# Patient Record
Sex: FEMALE | Race: BLACK OR AFRICAN AMERICAN | NOT HISPANIC OR LATINO | URBAN - METROPOLITAN AREA
[De-identification: names, ages, dates, MRNs, and addresses within clinical notes are randomized per-mention and may not be internally consistent; named-entity substitution may affect disease eponyms.]

---

## 2023-04-01 ENCOUNTER — INPATIENT (INPATIENT)
Facility: HOSPITAL | Age: 73
LOS: 8 days | Discharge: HOME | End: 2023-04-10
Attending: STUDENT IN AN ORGANIZED HEALTH CARE EDUCATION/TRAINING PROGRAM | Admitting: STUDENT IN AN ORGANIZED HEALTH CARE EDUCATION/TRAINING PROGRAM
Payer: MEDICARE

## 2023-04-01 VITALS
OXYGEN SATURATION: 97 % | DIASTOLIC BLOOD PRESSURE: 47 MMHG | TEMPERATURE: 100 F | HEART RATE: 92 BPM | RESPIRATION RATE: 16 BRPM | SYSTOLIC BLOOD PRESSURE: 103 MMHG

## 2023-04-01 DIAGNOSIS — I26.99 OTHER PULMONARY EMBOLISM WITHOUT ACUTE COR PULMONALE: ICD-10-CM

## 2023-04-01 DIAGNOSIS — R09.89 OTHER SPECIFIED SYMPTOMS AND SIGNS INVOLVING THE CIRCULATORY AND RESPIRATORY SYSTEMS: ICD-10-CM

## 2023-04-01 DIAGNOSIS — K57.32 DIVERTICULITIS OF LARGE INTESTINE WITHOUT PERFORATION OR ABSCESS WITHOUT BLEEDING: ICD-10-CM

## 2023-04-01 DIAGNOSIS — Z98.891 HISTORY OF UTERINE SCAR FROM PREVIOUS SURGERY: Chronic | ICD-10-CM

## 2023-04-01 LAB
ALBUMIN SERPL ELPH-MCNC: 2.6 G/DL — LOW (ref 3.3–5)
ALP SERPL-CCNC: 138 U/L — HIGH (ref 40–120)
ALT FLD-CCNC: 9 U/L — SIGNIFICANT CHANGE UP (ref 4–33)
ANION GAP SERPL CALC-SCNC: 10 MMOL/L — SIGNIFICANT CHANGE UP (ref 7–14)
ANISOCYTOSIS BLD QL: SLIGHT — SIGNIFICANT CHANGE UP
APTT BLD: 29.1 SEC — SIGNIFICANT CHANGE UP (ref 27–36.3)
AST SERPL-CCNC: 14 U/L — SIGNIFICANT CHANGE UP (ref 4–32)
BASE EXCESS BLDV CALC-SCNC: -0.9 MMOL/L — SIGNIFICANT CHANGE UP (ref -2–3)
BASOPHILS # BLD AUTO: 0 K/UL — SIGNIFICANT CHANGE UP (ref 0–0.2)
BASOPHILS NFR BLD AUTO: 0 % — SIGNIFICANT CHANGE UP (ref 0–2)
BILIRUB SERPL-MCNC: 0.4 MG/DL — SIGNIFICANT CHANGE UP (ref 0.2–1.2)
BLOOD GAS VENOUS COMPREHENSIVE RESULT: SIGNIFICANT CHANGE UP
BUN SERPL-MCNC: 13 MG/DL — SIGNIFICANT CHANGE UP (ref 7–23)
CALCIUM SERPL-MCNC: 8.5 MG/DL — SIGNIFICANT CHANGE UP (ref 8.4–10.5)
CHLORIDE BLDV-SCNC: 110 MMOL/L — HIGH (ref 96–108)
CHLORIDE SERPL-SCNC: 105 MMOL/L — SIGNIFICANT CHANGE UP (ref 98–107)
CO2 BLDV-SCNC: 24.5 MMOL/L — SIGNIFICANT CHANGE UP (ref 22–26)
CO2 SERPL-SCNC: 22 MMOL/L — SIGNIFICANT CHANGE UP (ref 22–31)
CREAT SERPL-MCNC: 0.88 MG/DL — SIGNIFICANT CHANGE UP (ref 0.5–1.3)
EGFR: 71 ML/MIN/1.73M2 — SIGNIFICANT CHANGE UP
EOSINOPHIL # BLD AUTO: 0 K/UL — SIGNIFICANT CHANGE UP (ref 0–0.5)
EOSINOPHIL NFR BLD AUTO: 0 % — SIGNIFICANT CHANGE UP (ref 0–6)
FLUAV AG NPH QL: SIGNIFICANT CHANGE UP
FLUBV AG NPH QL: SIGNIFICANT CHANGE UP
GAS PNL BLDV: 136 MMOL/L — SIGNIFICANT CHANGE UP (ref 136–145)
GIANT PLATELETS BLD QL SMEAR: PRESENT — SIGNIFICANT CHANGE UP
GLUCOSE BLDV-MCNC: 92 MG/DL — SIGNIFICANT CHANGE UP (ref 70–99)
GLUCOSE SERPL-MCNC: 96 MG/DL — SIGNIFICANT CHANGE UP (ref 70–99)
HCO3 BLDV-SCNC: 23 MMOL/L — SIGNIFICANT CHANGE UP (ref 22–29)
HCT VFR BLD CALC: 24.3 % — LOW (ref 34.5–45)
HCT VFR BLDA CALC: 22 % — LOW (ref 34.5–46.5)
HGB BLD CALC-MCNC: 7.4 G/DL — LOW (ref 11.7–16.1)
HGB BLD-MCNC: 7.3 G/DL — LOW (ref 11.5–15.5)
HYPOCHROMIA BLD QL: SLIGHT — SIGNIFICANT CHANGE UP
IANC: 8.82 K/UL — HIGH (ref 1.8–7.4)
INR BLD: 1.52 RATIO — HIGH (ref 0.88–1.16)
LACTATE BLDV-MCNC: 0.9 MMOL/L — SIGNIFICANT CHANGE UP (ref 0.5–2)
LIDOCAIN IGE QN: 10 U/L — SIGNIFICANT CHANGE UP (ref 7–60)
LYMPHOCYTES # BLD AUTO: 1.69 K/UL — SIGNIFICANT CHANGE UP (ref 1–3.3)
LYMPHOCYTES # BLD AUTO: 14.8 % — SIGNIFICANT CHANGE UP (ref 13–44)
MAGNESIUM SERPL-MCNC: 2.1 MG/DL — SIGNIFICANT CHANGE UP (ref 1.6–2.6)
MCHC RBC-ENTMCNC: 22.5 PG — LOW (ref 27–34)
MCHC RBC-ENTMCNC: 30 GM/DL — LOW (ref 32–36)
MCV RBC AUTO: 74.8 FL — LOW (ref 80–100)
MICROCYTES BLD QL: SLIGHT — SIGNIFICANT CHANGE UP
MONOCYTES # BLD AUTO: 1.09 K/UL — HIGH (ref 0–0.9)
MONOCYTES NFR BLD AUTO: 9.6 % — SIGNIFICANT CHANGE UP (ref 2–14)
NEUTROPHILS # BLD AUTO: 8.61 K/UL — HIGH (ref 1.8–7.4)
NEUTROPHILS NFR BLD AUTO: 75.6 % — SIGNIFICANT CHANGE UP (ref 43–77)
PCO2 BLDV: 36 MMHG — LOW (ref 39–52)
PH BLDV: 7.42 — SIGNIFICANT CHANGE UP (ref 7.32–7.43)
PLAT MORPH BLD: NORMAL — SIGNIFICANT CHANGE UP
PLATELET # BLD AUTO: 424 K/UL — HIGH (ref 150–400)
PLATELET COUNT - ESTIMATE: NORMAL — SIGNIFICANT CHANGE UP
PO2 BLDV: 130 MMHG — HIGH (ref 25–45)
POLYCHROMASIA BLD QL SMEAR: SLIGHT — SIGNIFICANT CHANGE UP
POTASSIUM BLDV-SCNC: 4 MMOL/L — SIGNIFICANT CHANGE UP (ref 3.5–5.1)
POTASSIUM SERPL-MCNC: 4.5 MMOL/L — SIGNIFICANT CHANGE UP (ref 3.5–5.3)
POTASSIUM SERPL-SCNC: 4.5 MMOL/L — SIGNIFICANT CHANGE UP (ref 3.5–5.3)
PROT SERPL-MCNC: 6.7 G/DL — SIGNIFICANT CHANGE UP (ref 6–8.3)
PROTHROM AB SERPL-ACNC: 17.7 SEC — HIGH (ref 10.5–13.4)
RBC # BLD: 3.25 M/UL — LOW (ref 3.8–5.2)
RBC # FLD: 17.3 % — HIGH (ref 10.3–14.5)
RBC BLD AUTO: ABNORMAL
RSV RNA NPH QL NAA+NON-PROBE: SIGNIFICANT CHANGE UP
SAO2 % BLDV: 97.5 % — HIGH (ref 67–88)
SARS-COV-2 RNA SPEC QL NAA+PROBE: SIGNIFICANT CHANGE UP
SODIUM SERPL-SCNC: 137 MMOL/L — SIGNIFICANT CHANGE UP (ref 135–145)
WBC # BLD: 11.39 K/UL — HIGH (ref 3.8–10.5)
WBC # FLD AUTO: 11.39 K/UL — HIGH (ref 3.8–10.5)

## 2023-04-01 PROCEDURE — 74177 CT ABD & PELVIS W/CONTRAST: CPT | Mod: 26,MA

## 2023-04-01 PROCEDURE — 99223 1ST HOSP IP/OBS HIGH 75: CPT | Mod: GC

## 2023-04-01 PROCEDURE — 99291 CRITICAL CARE FIRST HOUR: CPT

## 2023-04-01 PROCEDURE — 99221 1ST HOSP IP/OBS SF/LOW 40: CPT

## 2023-04-01 PROCEDURE — 71275 CT ANGIOGRAPHY CHEST: CPT | Mod: 26

## 2023-04-01 RX ORDER — PIPERACILLIN AND TAZOBACTAM 4; .5 G/20ML; G/20ML
3.38 INJECTION, POWDER, LYOPHILIZED, FOR SOLUTION INTRAVENOUS ONCE
Refills: 0 | Status: DISCONTINUED | OUTPATIENT
Start: 2023-04-01 | End: 2023-04-01

## 2023-04-01 RX ORDER — CHLORHEXIDINE GLUCONATE 213 G/1000ML
1 SOLUTION TOPICAL DAILY
Refills: 0 | Status: DISCONTINUED | OUTPATIENT
Start: 2023-04-01 | End: 2023-04-02

## 2023-04-01 RX ORDER — PIPERACILLIN AND TAZOBACTAM 4; .5 G/20ML; G/20ML
3.38 INJECTION, POWDER, LYOPHILIZED, FOR SOLUTION INTRAVENOUS EVERY 8 HOURS
Refills: 0 | Status: COMPLETED | OUTPATIENT
Start: 2023-04-01 | End: 2023-04-08

## 2023-04-01 RX ORDER — HYDROMORPHONE HYDROCHLORIDE 2 MG/ML
0.5 INJECTION INTRAMUSCULAR; INTRAVENOUS; SUBCUTANEOUS EVERY 6 HOURS
Refills: 0 | Status: DISCONTINUED | OUTPATIENT
Start: 2023-04-01 | End: 2023-04-02

## 2023-04-01 RX ORDER — OXYCODONE HYDROCHLORIDE 5 MG/1
5 TABLET ORAL EVERY 6 HOURS
Refills: 0 | Status: DISCONTINUED | OUTPATIENT
Start: 2023-04-01 | End: 2023-04-01

## 2023-04-01 RX ORDER — SODIUM CHLORIDE 9 MG/ML
1000 INJECTION, SOLUTION INTRAVENOUS
Refills: 0 | Status: DISCONTINUED | OUTPATIENT
Start: 2023-04-01 | End: 2023-04-04

## 2023-04-01 RX ORDER — HEPARIN SODIUM 5000 [USP'U]/ML
1300 INJECTION INTRAVENOUS; SUBCUTANEOUS
Qty: 25000 | Refills: 0 | Status: DISCONTINUED | OUTPATIENT
Start: 2023-04-01 | End: 2023-04-02

## 2023-04-01 RX ORDER — HEPARIN SODIUM 5000 [USP'U]/ML
1300 INJECTION INTRAVENOUS; SUBCUTANEOUS
Qty: 25000 | Refills: 0 | Status: DISCONTINUED | OUTPATIENT
Start: 2023-04-01 | End: 2023-04-01

## 2023-04-01 RX ORDER — ONDANSETRON 8 MG/1
4 TABLET, FILM COATED ORAL ONCE
Refills: 0 | Status: COMPLETED | OUTPATIENT
Start: 2023-04-01 | End: 2023-04-01

## 2023-04-01 RX ORDER — PIPERACILLIN AND TAZOBACTAM 4; .5 G/20ML; G/20ML
3.38 INJECTION, POWDER, LYOPHILIZED, FOR SOLUTION INTRAVENOUS ONCE
Refills: 0 | Status: COMPLETED | OUTPATIENT
Start: 2023-04-01 | End: 2023-04-01

## 2023-04-01 RX ORDER — ACETAMINOPHEN 500 MG
650 TABLET ORAL ONCE
Refills: 0 | Status: COMPLETED | OUTPATIENT
Start: 2023-04-01 | End: 2023-04-01

## 2023-04-01 RX ORDER — SODIUM CHLORIDE 9 MG/ML
1000 INJECTION, SOLUTION INTRAVENOUS ONCE
Refills: 0 | Status: COMPLETED | OUTPATIENT
Start: 2023-04-01 | End: 2023-04-01

## 2023-04-01 RX ORDER — ACETAMINOPHEN 500 MG
650 TABLET ORAL EVERY 6 HOURS
Refills: 0 | Status: DISCONTINUED | OUTPATIENT
Start: 2023-04-01 | End: 2023-04-02

## 2023-04-01 RX ADMIN — HEPARIN SODIUM 13 UNIT(S)/HR: 5000 INJECTION INTRAVENOUS; SUBCUTANEOUS at 18:14

## 2023-04-01 RX ADMIN — SODIUM CHLORIDE 125 MILLILITER(S): 9 INJECTION, SOLUTION INTRAVENOUS at 20:09

## 2023-04-01 RX ADMIN — PIPERACILLIN AND TAZOBACTAM 25 GRAM(S): 4; .5 INJECTION, POWDER, LYOPHILIZED, FOR SOLUTION INTRAVENOUS at 22:02

## 2023-04-01 RX ADMIN — HEPARIN SODIUM 13 UNIT(S)/HR: 5000 INJECTION INTRAVENOUS; SUBCUTANEOUS at 20:09

## 2023-04-01 RX ADMIN — PIPERACILLIN AND TAZOBACTAM 3.38 GRAM(S): 4; .5 INJECTION, POWDER, LYOPHILIZED, FOR SOLUTION INTRAVENOUS at 14:18

## 2023-04-01 RX ADMIN — SODIUM CHLORIDE 1000 MILLILITER(S): 9 INJECTION, SOLUTION INTRAVENOUS at 10:46

## 2023-04-01 RX ADMIN — PIPERACILLIN AND TAZOBACTAM 200 GRAM(S): 4; .5 INJECTION, POWDER, LYOPHILIZED, FOR SOLUTION INTRAVENOUS at 13:48

## 2023-04-01 RX ADMIN — Medication 650 MILLIGRAM(S): at 10:45

## 2023-04-01 RX ADMIN — SODIUM CHLORIDE 100 MILLILITER(S): 9 INJECTION, SOLUTION INTRAVENOUS at 18:18

## 2023-04-01 RX ADMIN — Medication 650 MILLIGRAM(S): at 11:15

## 2023-04-01 RX ADMIN — SODIUM CHLORIDE 1000 MILLILITER(S): 9 INJECTION, SOLUTION INTRAVENOUS at 11:46

## 2023-04-01 NOTE — ED PROVIDER NOTE - CARE PLAN
1 Principal Discharge DX:	Nausea vomiting and diarrhea   Principal Discharge DX:	Diverticulitis of large intestine with complication  Secondary Diagnosis:	Pulmonary embolism

## 2023-04-01 NOTE — ED PROVIDER NOTE - OBJECTIVE STATEMENT
72-year-old female with no reported past medical history presents to the ED with 2-week history of left lower quadrant abdominal pain.  Pain does not radiate anywhere else.  Patient endorses associated nausea, vomiting, and diarrhea.  Patient has been unable to tolerate p.o. and endorsing nonbilious nonbloody vomitus.  Patient states that her diarrhea is nonbloody, watery in nature.  She denies any fevers, chills, headaches, focal neurologic deficits, numbness, or tingling.  Pain is rated as 8 out of 10, waxing and waning in nature.  No exacerbating or alleviating factors.  No recent travel history, no vacations, no recent seafood consumption.  She denies any other symptoms at bedside.

## 2023-04-01 NOTE — H&P ADULT - ATTENDING COMMENTS
Perforated diverticulitis vs mass w/ acute PE  -Heparin  -transfuse PRBC as needed  -NPO  -IV abx  -SICU care appreciated  -serial abdominal exams

## 2023-04-01 NOTE — H&P ADULT - HISTORY OF PRESENT ILLNESS
72-year-old female with no reported past medical history, psh *2 presents to the ED with 2-week history of left lower quadrant abdominal pain.  Pain does not radiate anywhere else.  Patient endorses associated nausea, vomiting, diarrhea and poor p.o  tolerance. Patient states saw blood with BM twice in the past 2 weeks, obvious weight loss within last 2 weeks also.  She denies any fevers, chills, headaches, focal neurologic deficits, numbness, or tingling.  Last colonoscopy within last 5-10 years, polyps were found per patient. Patient also endorse she has chronic anemia but does not know baseline Hg.     In ER,  patient is afebrile, HDS. LLQ tenderness, no peritoneal sign, WBC 11.4, Hg 7.3, CTAP showed  descending and proximal sigmoid colon inflammation with air containing extraluminal collection, possible  diverticulitis, inflammatory bowel disease or neoplasm. Besides, acute RLL PE captured on CTAP.

## 2023-04-01 NOTE — ED ADULT NURSE NOTE - CAS EDP DISCH DISPOSITION ADMI
From: Alverto Culver  To: Andrea Smiley PA-C  Sent: 1/17/2018 10:49 AM EST  Subject: Medication Renewal Request    Original authorizing provider: DAFNE Mitchell would like a refill of the following medications:  hydrocortisone valerate (WESTCORT) 0.2 % topical cream [Nena Lewis PA-C]  gabapentin (NEURONTIN) 100 mg capsule Lauern Lewis PA-C]  tamsulosin (FLOMAX) 0.4 mg capsule Lauren Lewis PA-C]  ketoconazole (NIZORAL) 2 % shampoo [Nena Lewis PA-C]  albuterol (PROVENTIL HFA, VENTOLIN HFA, PROAIR HFA) 90 mcg/actuation inhaler [Nena Lewis PA-C]  NIFEdipine ER (ADALAT CC) 60 mg ER tablet [Nena Lewis PA-C]  metFORMIN ER (GLUCOPHAGE XR) 750 mg tablet Andrea Smiley PA-C]    Preferred pharmacy: [facility] [address]    Comment:      Medication renewals requested in this message routed to other providers:  fluticasone-salmeterol (ADVAIR) 250-50 mcg/dose diskus inhaler [Radha Moulton PA-C] SICU/ICU

## 2023-04-01 NOTE — CONSULT NOTE ADULT - ASSESSMENT
ASSESSMENT:  72 F no PMH, PSH of  p/w LLQ abd pain and diarrhea for 2 weeks, labs significant for anemia with Hg 7.3, CTAP showed contained perforation possible from acute diverticulitis or neoplasm; with incidental finding of right lower lobe acute PE.  Patient is HDS. SICU consulted for concerns for potential hemorrhage after initiation of AC therapy for PE finding.      PLAN:    Neuro:  - Multimodal pain control    Resp:  - On room air  - Maintain SpO2 > 92%  - Obtain CTA PE protocol to better evaluate PE when able    CV:  - Hemodynamically stable  - Goal MAP > 65 mmHg    GI:   - NPO  - Serial abdominal exams    Renal:  - IVF resuscitation - LR  - Monitor I&Os and electrolytes w/ repletions as necessary    Heme:  - Initiate hep gtt for therapeutic AC for PE (goal aPTT 58-99)  - Monitor CBC and coags q6h    ID:   - Monitor for clinical evidence of active infection  - Monitor WBC and temperature  - Antibiotics: Zosyn    Endo:   - Monitor glucose on metabolic panel    Code Status: Full code    Disposition: SICU

## 2023-04-01 NOTE — ED ADULT NURSE NOTE - OBJECTIVE STATEMENT
Received patient in room 23 c/o Received patient in room 23 c/o nausea, vomiting, diarrhea, blood in stool, abdominal pain x 2 weeks, patient denies fever, chills, SOB, chest pain. Patient is on cardiac monitor NSR w/O2 sat 100% on a room air. Patient is A&OX4, airway patent, breathing unlabored and even, radial pulses palpable, abdomen, soft, tender. Labs obtained, 22G IV placed on left hand, medications given as ordered, IV fluid bolus infusing, awaiting CT scan. Side rails up and safety maintained. Fall precaution in place. Call bells within reach. Received patient in room 23 c/o nausea, vomiting, diarrhea, blood in stool, abdominal pain x 2 weeks, patient denies fever, chills, SOB, chest pain. Patient is on cardiac monitor NSR w/O2 sat 100% on a room air. Patient is A&OX4, airway patent, breathing unlabored and even, radial pulses palpable, abdomen, soft, tender. Labs obtained, 20G IV placed on left arm, medications given as ordered, IV fluid bolus infusing, awaiting CT scan. Side rails up and safety maintained. Fall precaution in place. Call bells within reach.

## 2023-04-01 NOTE — H&P ADULT - ASSESSMENT
72 F no PMH, PSH of  p/w LLQ abd pain and diarrhea for 2 weeks, labs significant for anemia with Hg 7.3, CTAP showed contained perforation possible from acute diverticulitis or neoplasm,    -year-old female with no reported past medical history, psh *2 presents to the ED with 2-week history of left lower quadrant abdominal pain.  Pain does not radiate anywhere else.  Patient endorses associated nausea, vomiting, diarrhea and poor p.o  tolerance. Patient states saw blood with BM twice in the past 2 weeks, obvious weight loss within last 2 weeks also.  She denies any fevers, chills, headaches, focal neurologic deficits, numbness, or tingling.  Last colonoscopy within last 5-10 years, polyps were found per patient. Patient also endorse she has chronic anemia but does not know baseline Hg.     In ER,  patient is afebrile, HDS. LLQ tenderness, no peritoneal sign, WBC 11.4, Hg 7.3, CTAP showed  descending and proximal sigmoid colon inflammation with air containing extraluminal collection, possible  diverticulitis, inflammatory bowel disease or neoplasm. Besides, acute RLL PE captured on CTAP.  72 F no PMH, PSH of  p/w LLQ abd pain and diarrhea for 2 weeks, labs significant for anemia with Hg 7.3, CTAP showed contained perforation possible from acute diverticulitis or neoplasm; with incidental finding of right lower lobe acute PE.  Patient is HDS.     Plan:  - admit to surgery, Dr. Merrill  [facility] with [facility] w/ unknown source planning for therapeutic AC; pending response  - start therapeutic heparin gtt  - will get b/l LE venous duplex to r/o DVT  - will get CTPE to better eval PE status  - will get CTA  - NPO/IVF  - iv abx, zosyn  - f/u labs  - plan discussed with fellow on call on behalf of Dr. Garfield Lewis, Joshua PGY-2  A team Surgery  [id number]    72 F no PMH, PSH of  p/w LLQ abd pain and diarrhea for 2 weeks, labs significant for anemia with Hg 7.3, CTAP showed contained perforation possible from acute diverticulitis or neoplasm; with incidental finding of right lower lobe acute PE.  Patient is HDS.     Plan:  - admit to surgery, Dr. Merrill  [facility] with [facility] w/ unknown source planning for therapeutic AC; pending response  - start therapeutic heparin gtt  - 2 units PRBC transfusion stat  - will get b/l LE venous duplex to r/o DVT  - will get CTPE to better eval PE status  - will get CTA  - NPO/IVF  - iv abx, zosyn  - f/u labs  - plan discussed with fellow on call on behalf of Dr. Garfield Lewis, Joshua PGY-2  A team Surgery  [id number]

## 2023-04-01 NOTE — ED PROVIDER NOTE - ATTENDING CONTRIBUTION TO CARE
Attending MD Ames:  I performed a history and physical exam of the patient and discussed their management with the resident. I reviewed the resident's note and agree with the documented findings and plan of care. My medical decision making and observations are found above. Attending MD Ames:  I performed a history and physical exam of the patient and discussed their management with the resident. I reviewed the resident's note and agree with the documented findings and plan of care. My medical decision making and observations are found above.    Critical care billing:  Upon my evaluation, this patient had a high probability of imminent or life-threatening deterioration due to poss diverticular perf -> SICU, which required my direct attention, intervention, and personal management.  The patient has a  medical condition that impairs one or more vital organ systems.  Frequent personal assessment and adjustment of medical interventions was performed.      I have personally provided 30 minutes of critical care time exclusive of time spent on separately billable procedures. Time includes review of laboratory data, radiology results, discussion with consultants, patient and family; monitoring for potential decompensation, as well as time spent retrieving data and reviewing the chart and documenting the visit. Interventions were performed as documented above.

## 2023-04-01 NOTE — ED PROVIDER NOTE - CLINICAL SUMMARY MEDICAL DECISION MAKING FREE TEXT BOX
72-year-old female with no reported past medical history presents to the ED with 2-week history of left lower quadrant abdominal pain. Endorses associated nonbilious nonbloody nausea and nonbloody diarrhea.  Initial vitals nonactionable.  Afebrile, normotensive, breathing comfortably at bedside without any acute distress.  Physical exam as noted above.  Patient is otherwise well-appearing, left lower quadrant abdominal pain with guarding and tenderness to palpation.  Other parts of the abdominal exam is otherwise benign.  No rashes noted.  Dry mucous membranes.  Differential diagnosis includes but not limited to infectious colitis versus autoimmune colitis versus diverticulitis versus metabolic derangements.  Will order labs, EKG, meds, imaging, and reassess. 72-year-old female with no reported past medical history presents to the ED with 2-week history of left lower quadrant abdominal pain. Endorses associated nonbilious nonbloody nausea and nonbloody diarrhea.  Initial vitals nonactionable.  Afebrile, normotensive, breathing comfortably at bedside without any acute distress.  Physical exam as noted above.  Patient is otherwise well-appearing, left lower quadrant abdominal pain with guarding and tenderness to palpation.  Other parts of the abdominal exam is otherwise benign.  No rashes noted.  Dry mucous membranes.  Differential diagnosis includes but not limited to infectious colitis versus autoimmune colitis versus diverticulitis versus metabolic derangements.  Will order labs, EKG, meds, imaging, and reassess.    Attending MD Ames.  Agree with above.  Pt is a 71 yo fem with no sig pmhx who presents to ED with complaint of 2 wk hx of LLQ pain that does not radiate.  Pt endorses assoc nvd.  She has progressed to unable to tolerate PO and endorses nbnb emesis.  Pt denies blood in stool.  Denies fevers/chills.  No known hx of diverticulitis.  Focal LLQ TTP.  No assoc vaginal discharge/bleeding. 72-year-old female with no reported past medical history presents to the ED with 2-week history of left lower quadrant abdominal pain. Endorses associated nonbilious nonbloody nausea and nonbloody diarrhea.  Initial vitals nonactionable.  Afebrile, normotensive, breathing comfortably at bedside without any acute distress.  Physical exam as noted above.  Patient is otherwise well-appearing, left lower quadrant abdominal pain with guarding and tenderness to palpation.  Other parts of the abdominal exam is otherwise benign.  No rashes noted.  Dry mucous membranes.  Differential diagnosis includes but not limited to infectious colitis versus autoimmune colitis versus diverticulitis versus metabolic derangements.  Will order labs, EKG, meds, imaging, and reassess.    Attending MD Ames.  Agree with above.  Pt is a 71 yo fem with no sig pmhx who presents to ED with complaint of 2 wk hx of LLQ pain that does not radiate.  Pt endorses assoc n/v/d.  She has progressed to unable to tolerate PO and endorses nbnb emesis.  Pt denies blood in stool.  Denies fevers/chills.  No known hx of diverticulitis.  Focal LLQ TTP.  No assoc vaginal discharge/bleeding.  CT c/w poss perf +/- colonic inflammation vs. malignancy.  SICU accepting pt to their service for abxs and additional w/u.

## 2023-04-01 NOTE — CONSULT NOTE ADULT - SUBJECTIVE AND OBJECTIVE BOX
SICU Consult Note    HISTORY OF PRESENT ILLNESS:  72-year-old female with no reported past medical history, psh *2 presents to the ED with 2-week history of left lower quadrant abdominal pain.  Pain does not radiate anywhere else.  Patient endorses associated nausea, vomiting, diarrhea and poor p.o  tolerance. Patient states saw blood with BM twice in the past 2 weeks, obvious weight loss within last 2 weeks also.  She denies any fevers, chills, headaches, focal neurologic deficits, numbness, or tingling.  Last colonoscopy within last 5-10 years, polyps were found per patient. Patient also endorse she has chronic anemia but does not know baseline Hg.     In ER,  patient is afebrile, HDS. LLQ tenderness, no peritoneal sign, WBC 11.4, Hg 7.3, CTAP showed  descending and proximal sigmoid colon inflammation with air containing extraluminal collection, possible  diverticulitis, inflammatory bowel disease or neoplasm. Besides, acute RLL PE captured on CTAP.     SICU consulted for concerns for hemorrhage once hep gtt initiated for PE.      PAST MEDICAL HISTORY: No pertinent past medical history      PAST SURGICAL HISTORY: No significant past surgical history    S/P       HOME MEDICATIONS: Home Medications:    ALLERGIES: No Known Allergies    FAMILY HISTORY:   SOCIAL HISTORY:  REVIEW OF SYSTEMS:  VITAL SIGNS:  ICU Vital Signs Last 24 Hrs  T(C): 36.7 (2023 16:35), Max: 37.5 (2023 09:07)  T(F): 98.1 (2023 16:35), Max: 99.5 (2023 09:07)  HR: 69 (2023 16:35) (69 - 98)  BP: 97/74 (2023 16:35) (97/74 - 106/51)  BP(mean): --  ABP: --  ABP(mean): --  RR: 17 (2023 16:35) (16 - 17)  SpO2: 100% (2023 16:35) (97% - 100%)    O2 Parameters below as of 2023 16:35  Patient On (Oxygen Delivery Method): room air          PHYSICAL EXAMINATION:  General: NAD, Lying in bed comfortably  Neuro: A+Ox3  HEENT: NC/AT, EOMI  Neck: Soft, supple  Cardio: RRR, nml S1/S2  Resp: Good effort, CTA b/l  Thorax: No chest wall tenderness  Breast: No lesions/masses, no drainage  GI/Abd: Soft, LLQ tenderness, no peritoneal sign, ND, no rebound/guarding, no masses palpated  Vascular: All 4 extremities warm.  Skin: Intact, no breakdown  Musculoskeletal: All 4 extremities moving spontaneously, no limitations      LABS:                        7.3    11.39 )-----------( 424      ( 2023 10:29 )             24.3         137  |  105  |  13  ----------------------------<  96  4.5   |  22  |  0.88    Ca    8.5      2023 10:29  Mg     2.10     -    TPro  6.7  /  Alb  2.6<L>  /  TBili  0.4  /  DBili  x   /  AST  14  /  ALT  9   /  AlkPhos  138<H>      PT/INR - ( 2023 14:18 )   PT: 17.7 sec;   INR: 1.52 ratio         PTT - ( 2023 14:18 )  PTT:29.1 sec    VBG - ( 2023 10:29 )  pH: 7.42  /  pCO2: 36    /  pO2: 130   / HCO3: 23    / Base Excess: -0.9  /  SaO2: 97.5   Lactate: 0.9              RECENT CULTURES:    CAPILLARY BLOOD GLUCOSE        IMAGING STUDIES:  < from: CT Abdomen and Pelvis w/ IV Cont (23 @ 11:29) >    ACC: [id number] EXAM:  CT ABDOMEN AND PELVIS IC   ORDERED BY: SARA TOBAR     PROCEDURE DATE:  2023          INTERPRETATION:  CLINICAL INFORMATION: Left lower quadrant abdominal pain.    COMPARISON: None.    CONTRAST/COMPLICATIONS:  IV Contrast: Omnipaque 350  90 cc administered   10 cc discarded  Oral Contrast: NONE  Complications: None reported at time of study completion    PROCEDURE:  CT of the Abdomen and Pelvis was performed.  Sagittal and coronal reformats were performed.    FINDINGS:  LOWER CHEST: Acute right lower lobe segmental pulmonary embolus.    LIVER: Within normal limits.  BILE DUCTS: Normal caliber.  GALLBLADDER: Within normal limits.  SPLEEN: Within normal limits.  PANCREAS: Within normal limits.  ADRENALS: Within normal limits.  KIDNEYS/URETERS: Left renal cyst. No hydronephrosis.    BLADDER: Within normal limits.  REPRODUCTIVE ORGANS: Uterus and right adnexa within normal limits. Left   adnexa is immediately adjacent to and inseparable from a left lower   quadrant collection as below.    BOWEL: No bowel obstruction. Colonic diverticulosis. Thickening of the   descending and proximal colon which contains diverticula. Surrounding   inflammation with linear extramural sinus tracts containing air as well   as a thick walled extraluminal collection (301:85) containing mostly air   measuring 5.2 x 2.8 cm in the left lower quadrant. This collection   immediately abuts the left ovary. While findings may be related to   diverticulitis, based on marked thickening at this level, inflammatory   bowel disease as well as neoplasm are also differential considerations.  PERITONEUM: Small pelvic ascites. No free air.  VESSELS: Atherosclerotic changes. A linear filling defect within the left   portal vein may represent a small nonocclusive thrombus.  RETROPERITONEUM/LYMPH NODES: Subcentimeter short axis of the   retroperitoneal nodes are nonspecific.  ABDOMINAL WALL: Within normal limits.  BONES: Within normal limits.    IMPRESSION:  Acute right lower lobe segmental pulmonary embolus.    Marked inflammation involving the descending and proximal sigmoid colon   for which the differential diagnosis includes diverticulitis,   inflammatory bowel disease and neoplasm. Extraluminal sinus tracts   containing air as well as a thick walled extraluminal collection   containing mostly air in the left lower quadrant.    Questionable nonocclusive thrombus in the left portal vein.    Findings were discussed with Dr. SARA TOBAR  2023 12:13 PM by Dr. Fu  with read back confirmation.    --- End of Report ---            JASKARAN FU MD; Attending Radiologist  This document has been electronically signed. 2023 12:19PM    < end of copied text >

## 2023-04-01 NOTE — H&P ADULT - NSHPLABSRESULTS_GEN_ALL_CORE
< from: CT Abdomen and Pelvis w/ IV Cont (04.01.23 @ 11:29) >    ACC: [id number] EXAM:  CT ABDOMEN AND PELVIS IC   ORDERED BY: SARA TOBAR     PROCEDURE DATE:  04/01/2023          INTERPRETATION:  CLINICAL INFORMATION: Left lower quadrant abdominal pain.    COMPARISON: None.    CONTRAST/COMPLICATIONS:  IV Contrast: Omnipaque 350  90 cc administered   10 cc discarded  Oral Contrast: NONE  Complications: None reported at time of study completion    PROCEDURE:  CT of the Abdomen and Pelvis was performed.  Sagittal and coronal reformats were performed.    FINDINGS:  LOWER CHEST: Acute right lower lobe segmental pulmonary embolus.    LIVER: Within normal limits.  BILE DUCTS: Normal caliber.  GALLBLADDER: Within normal limits.  SPLEEN: Within normal limits.  PANCREAS: Within normal limits.  ADRENALS: Within normal limits.  KIDNEYS/URETERS: Left renal cyst. No hydronephrosis.    BLADDER: Within normal limits.  REPRODUCTIVE ORGANS: Uterus and right adnexa within normal limits. Left   adnexa is immediately adjacent to and inseparable from a left lower   quadrant collection as below.    BOWEL: No bowel obstruction. Colonic diverticulosis. Thickening of the   descending and proximal colon which contains diverticula. Surrounding   inflammation with linear extramural sinus tracts containing air as well   as a thick walled extraluminal collection (301:85) containing mostly air   measuring 5.2 x 2.8 cm in the left lower quadrant. This collection   immediately abuts the left ovary. While findings may be related to   diverticulitis, based on marked thickening at this level, inflammatory   bowel disease as well as neoplasm are also differential considerations.  PERITONEUM: Small pelvic ascites. No free air.  VESSELS: Atherosclerotic changes. A linear filling defect within the left   portal vein may represent a small nonocclusive thrombus.  RETROPERITONEUM/LYMPH NODES: Subcentimeter short axis of the   retroperitoneal nodes are nonspecific.  ABDOMINAL WALL: Within normal limits.  BONES: Within normal limits.    IMPRESSION:  Acute right lower lobe segmental pulmonary embolus.    Marked inflammation involving the descending and proximal sigmoid colon   for which the differential diagnosis includes diverticulitis,   inflammatory bowel disease and neoplasm. Extraluminal sinus tracts   containing air as well as a thick walled extraluminal collection   containing mostly air in the left lower quadrant.    Questionable nonocclusive thrombus in the left portal vein.    Findings were discussed with Dr. SARA TOBAR  4/1/2023 12:13 PM by Dr. Fu  with read back confirmation.    < end of copied text >

## 2023-04-01 NOTE — ED PROVIDER NOTE - PHYSICAL EXAMINATION
GENERAL: no acute distress, non-toxic appearing  HEAD: normocephalic, atraumatic  HEENT: normal conjunctiva, oral mucosa moist, neck supple  CARDIAC: regular rate and rhythm, normal S1 and S2,  no appreciable murmurs  PULM: clear to ascultation bilaterally, no crackles, rales, rhonchi, or wheezing    GI: abdomen nondistended, Left lower quadrant tenderness to palpation with abdominal guarding, no other quadrants of the abdomen is otherwise benign.    : no CVA tenderness, no suprapubic tenderness  NEURO: alert and oriented x 3, normal speech, PERRLA, EOMI, no focal motor or sensory deficits  MSK: no visible deformities, no peripheral edema, calf tenderness/redness/swelling  SKIN: no visible rashes, dry, well-perfused  PSYCH: appropriate mood and affect

## 2023-04-01 NOTE — ED ADULT TRIAGE NOTE - CHIEF COMPLAINT QUOTE
diarrhea, vomiting, and blood in stool x 2 weeks, a/w weakness, unable to tolerate PO, denies antibiotic use, no PMHx

## 2023-04-01 NOTE — ED ADULT NURSE REASSESSMENT NOTE - NS ED NURSE REASSESS COMMENT FT1
Pt resting in stretcher with no complaints at this time. Heparin infusion administered as ordered, see MAR. RN Bowden at bedside for verification. Pt educated on benefits and potential side effects of heparin. Safety maintained throughout. Report given to primary RN Bowden. Pt stable upon exiting room.
Report given to NORAH Harmon from SICU for continuity of care.
Break RN: Pt is A&Ox4, ambulatory at baseline and resting in stretcher withy no complaints at this time. Respirations even and unlabored, chest rise equal b/l. NSR on cardiac monitor. VS as noted in flow sheets. Pt denies chest pain, SOB, fever, cough, chills, N/V/D, abdominal pain or any urinary symptoms at this time. Safety maintained throughout, will continue to monitor.

## 2023-04-01 NOTE — H&P ADULT - NSHPPHYSICALEXAM_GEN_ALL_CORE
General: NAD, Lying in bed comfortably  Neuro: A+Ox3  HEENT: NC/AT, EOMI  Neck: Soft, supple  Cardio: RRR, nml S1/S2  Resp: Good effort, CTA b/l  Thorax: No chest wall tenderness  Breast: No lesions/masses, no drainage  GI/Abd: Soft, LLQ tenderness, no peritoneal sign, ND, no rebound/guarding, no masses palpated  Vascular: All 4 extremities warm.  Skin: Intact, no breakdown  Musculoskeletal: All 4 extremities moving spontaneously, no limitations

## 2023-04-01 NOTE — CONSULT NOTE ADULT - ATTENDING COMMENTS
acute on chronic anemia  -monitor for active hemorrhage  PE  -eval for right heart strain  -start therapeutic anti-coagulation once active bleeding is ruled out  perforated diverticulitis versus mass of other etiology  -antibiotics

## 2023-04-01 NOTE — ED PROVIDER NOTE - DIFFERENTIAL DIAGNOSIS
Differential diagnosis includes but not limited to infectious colitis versus autoimmune colitis versus diverticulitis versus metabolic derangements. Differential Diagnosis

## 2023-04-02 LAB
ANION GAP SERPL CALC-SCNC: 11 MMOL/L — SIGNIFICANT CHANGE UP (ref 7–14)
ANION GAP SERPL CALC-SCNC: 21 MMOL/L — HIGH (ref 7–14)
APTT BLD: 22.9 SEC — LOW (ref 27–36.3)
APTT BLD: 28.6 SEC — SIGNIFICANT CHANGE UP (ref 27–36.3)
APTT BLD: 71.6 SEC — HIGH (ref 27–36.3)
BLD GP AB SCN SERPL QL: NEGATIVE — SIGNIFICANT CHANGE UP
BUN SERPL-MCNC: 6 MG/DL — LOW (ref 7–23)
BUN SERPL-MCNC: 9 MG/DL — SIGNIFICANT CHANGE UP (ref 7–23)
CALCIUM SERPL-MCNC: 6.7 MG/DL — LOW (ref 8.4–10.5)
CALCIUM SERPL-MCNC: 8.4 MG/DL — SIGNIFICANT CHANGE UP (ref 8.4–10.5)
CHLORIDE SERPL-SCNC: 103 MMOL/L — SIGNIFICANT CHANGE UP (ref 98–107)
CHLORIDE SERPL-SCNC: 97 MMOL/L — LOW (ref 98–107)
CO2 SERPL-SCNC: 12 MMOL/L — LOW (ref 22–31)
CO2 SERPL-SCNC: 21 MMOL/L — LOW (ref 22–31)
CREAT SERPL-MCNC: 0.45 MG/DL — LOW (ref 0.5–1.3)
CREAT SERPL-MCNC: 0.83 MG/DL — SIGNIFICANT CHANGE UP (ref 0.5–1.3)
EGFR: 103 ML/MIN/1.73M2 — SIGNIFICANT CHANGE UP
EGFR: 76 ML/MIN/1.73M2 — SIGNIFICANT CHANGE UP
GLUCOSE SERPL-MCNC: 338 MG/DL — HIGH (ref 70–99)
GLUCOSE SERPL-MCNC: 86 MG/DL — SIGNIFICANT CHANGE UP (ref 70–99)
HCT VFR BLD CALC: 17.1 % — CRITICAL LOW (ref 34.5–45)
HCT VFR BLD CALC: 22.4 % — LOW (ref 34.5–45)
HCT VFR BLD CALC: 27.3 % — LOW (ref 34.5–45)
HGB BLD-MCNC: 5.2 G/DL — CRITICAL LOW (ref 11.5–15.5)
HGB BLD-MCNC: 6.8 G/DL — CRITICAL LOW (ref 11.5–15.5)
HGB BLD-MCNC: 8.4 G/DL — LOW (ref 11.5–15.5)
INR BLD: 1.41 RATIO — HIGH (ref 0.88–1.16)
MAGNESIUM SERPL-MCNC: 1.2 MG/DL — LOW (ref 1.6–2.6)
MAGNESIUM SERPL-MCNC: 1.9 MG/DL — SIGNIFICANT CHANGE UP (ref 1.6–2.6)
MCHC RBC-ENTMCNC: 22.1 PG — LOW (ref 27–34)
MCHC RBC-ENTMCNC: 22.6 PG — LOW (ref 27–34)
MCHC RBC-ENTMCNC: 23.1 PG — LOW (ref 27–34)
MCHC RBC-ENTMCNC: 30.4 GM/DL — LOW (ref 32–36)
MCHC RBC-ENTMCNC: 30.4 GM/DL — LOW (ref 32–36)
MCHC RBC-ENTMCNC: 30.8 GM/DL — LOW (ref 32–36)
MCV RBC AUTO: 73 FL — LOW (ref 80–100)
MCV RBC AUTO: 74.3 FL — LOW (ref 80–100)
MCV RBC AUTO: 75.2 FL — LOW (ref 80–100)
NRBC # BLD: 0 /100 WBCS — SIGNIFICANT CHANGE UP (ref 0–0)
NRBC # FLD: 0 K/UL — SIGNIFICANT CHANGE UP (ref 0–0)
PHOSPHATE SERPL-MCNC: 2 MG/DL — LOW (ref 2.5–4.5)
PHOSPHATE SERPL-MCNC: 3.6 MG/DL — SIGNIFICANT CHANGE UP (ref 2.5–4.5)
PLATELET # BLD AUTO: 328 K/UL — SIGNIFICANT CHANGE UP (ref 150–400)
PLATELET # BLD AUTO: 387 K/UL — SIGNIFICANT CHANGE UP (ref 150–400)
PLATELET # BLD AUTO: 441 K/UL — HIGH (ref 150–400)
POTASSIUM SERPL-MCNC: 3.6 MMOL/L — SIGNIFICANT CHANGE UP (ref 3.5–5.3)
POTASSIUM SERPL-MCNC: 3.8 MMOL/L — SIGNIFICANT CHANGE UP (ref 3.5–5.3)
POTASSIUM SERPL-SCNC: 3.6 MMOL/L — SIGNIFICANT CHANGE UP (ref 3.5–5.3)
POTASSIUM SERPL-SCNC: 3.8 MMOL/L — SIGNIFICANT CHANGE UP (ref 3.5–5.3)
PROTHROM AB SERPL-ACNC: 16.4 SEC — HIGH (ref 10.5–13.4)
RBC # BLD: 2.3 M/UL — LOW (ref 3.8–5.2)
RBC # BLD: 3.07 M/UL — LOW (ref 3.8–5.2)
RBC # BLD: 3.63 M/UL — LOW (ref 3.8–5.2)
RBC # FLD: 17.4 % — HIGH (ref 10.3–14.5)
RBC # FLD: 17.6 % — HIGH (ref 10.3–14.5)
RBC # FLD: 18 % — HIGH (ref 10.3–14.5)
RH IG SCN BLD-IMP: POSITIVE — SIGNIFICANT CHANGE UP
RH IG SCN BLD-IMP: POSITIVE — SIGNIFICANT CHANGE UP
SODIUM SERPL-SCNC: 130 MMOL/L — LOW (ref 135–145)
SODIUM SERPL-SCNC: 135 MMOL/L — SIGNIFICANT CHANGE UP (ref 135–145)
WBC # BLD: 13.82 K/UL — HIGH (ref 3.8–10.5)
WBC # BLD: 8.45 K/UL — SIGNIFICANT CHANGE UP (ref 3.8–10.5)
WBC # BLD: 9.99 K/UL — SIGNIFICANT CHANGE UP (ref 3.8–10.5)
WBC # FLD AUTO: 13.82 K/UL — HIGH (ref 3.8–10.5)
WBC # FLD AUTO: 8.45 K/UL — SIGNIFICANT CHANGE UP (ref 3.8–10.5)
WBC # FLD AUTO: 9.99 K/UL — SIGNIFICANT CHANGE UP (ref 3.8–10.5)

## 2023-04-02 PROCEDURE — 99222 1ST HOSP IP/OBS MODERATE 55: CPT

## 2023-04-02 PROCEDURE — 99232 SBSQ HOSP IP/OBS MODERATE 35: CPT | Mod: GC

## 2023-04-02 RX ORDER — HEPARIN SODIUM 5000 [USP'U]/ML
1300 INJECTION INTRAVENOUS; SUBCUTANEOUS
Qty: 25000 | Refills: 0 | Status: DISCONTINUED | OUTPATIENT
Start: 2023-04-02 | End: 2023-04-02

## 2023-04-02 RX ORDER — ENOXAPARIN SODIUM 100 MG/ML
75 INJECTION SUBCUTANEOUS EVERY 12 HOURS
Refills: 0 | Status: DISCONTINUED | OUTPATIENT
Start: 2023-04-02 | End: 2023-04-04

## 2023-04-02 RX ORDER — ACETAMINOPHEN 500 MG
975 TABLET ORAL EVERY 6 HOURS
Refills: 0 | Status: DISCONTINUED | OUTPATIENT
Start: 2023-04-02 | End: 2023-04-03

## 2023-04-02 RX ORDER — OXYCODONE HYDROCHLORIDE 5 MG/1
10 TABLET ORAL EVERY 4 HOURS
Refills: 0 | Status: DISCONTINUED | OUTPATIENT
Start: 2023-04-02 | End: 2023-04-02

## 2023-04-02 RX ADMIN — ENOXAPARIN SODIUM 75 MILLIGRAM(S): 100 INJECTION SUBCUTANEOUS at 10:15

## 2023-04-02 RX ADMIN — PIPERACILLIN AND TAZOBACTAM 25 GRAM(S): 4; .5 INJECTION, POWDER, LYOPHILIZED, FOR SOLUTION INTRAVENOUS at 14:27

## 2023-04-02 RX ADMIN — Medication 975 MILLIGRAM(S): at 12:04

## 2023-04-02 RX ADMIN — PIPERACILLIN AND TAZOBACTAM 25 GRAM(S): 4; .5 INJECTION, POWDER, LYOPHILIZED, FOR SOLUTION INTRAVENOUS at 21:31

## 2023-04-02 RX ADMIN — PIPERACILLIN AND TAZOBACTAM 25 GRAM(S): 4; .5 INJECTION, POWDER, LYOPHILIZED, FOR SOLUTION INTRAVENOUS at 06:47

## 2023-04-02 RX ADMIN — Medication 975 MILLIGRAM(S): at 12:00

## 2023-04-02 NOTE — PATIENT PROFILE ADULT - FALL HARM RISK - HARM RISK INTERVENTIONS

## 2023-04-02 NOTE — PATIENT PROFILE ADULT - LEGAL HELP
-- DO NOT REPLY / DO NOT REPLY ALL --  -- Message is from the [facility]--    COVID-19 Universal Screening: Negative questions were not asked    Pediatric Specialty Appointment Request    Name: Tana Soler  : 2019  MRN: [id number]    Caller Information       Type Contact Phone    2021 01:01 PM CST Phone (Incoming) REJI SOLER (Mother) [phone]          Appointment requested with:  Reji Quiles MD  Specialty:  Peds Urology    Reason for appointment:  Kidney ultrasound and consult    Referred by: [facility]    Insurance verified:  Yes      Baby is 3 days old, mom's insurance is o [facility]    Patient scheduling preference:  AM before 12:00 pm    No preference    Patient requests callback: Yes   Callback phone number: [phone]    Turnaround time given to caller:   \"This message will be sent to [state Provider's name]. The clinical team will fulfill your request as soon as they review your message.\"   no

## 2023-04-02 NOTE — CHART NOTE - NSCHARTNOTEFT_GEN_A_CORE
Patient transferring from SICU to floor (3N 302B). Sign out given to A Team resident (Dr. Boyer), all questions answered.    Patient transitioned to weight-based T. Lovenox today for AC.

## 2023-04-03 LAB
ANION GAP SERPL CALC-SCNC: 12 MMOL/L — SIGNIFICANT CHANGE UP (ref 7–14)
BUN SERPL-MCNC: 8 MG/DL — SIGNIFICANT CHANGE UP (ref 7–23)
CALCIUM SERPL-MCNC: 8.2 MG/DL — LOW (ref 8.4–10.5)
CEA SERPL-MCNC: 1.9 NG/ML — SIGNIFICANT CHANGE UP (ref 1–3.8)
CHLORIDE SERPL-SCNC: 104 MMOL/L — SIGNIFICANT CHANGE UP (ref 98–107)
CO2 SERPL-SCNC: 20 MMOL/L — LOW (ref 22–31)
CREAT SERPL-MCNC: 0.79 MG/DL — SIGNIFICANT CHANGE UP (ref 0.5–1.3)
EGFR: 80 ML/MIN/1.73M2 — SIGNIFICANT CHANGE UP
GLUCOSE BLDC GLUCOMTR-MCNC: 103 MG/DL — HIGH (ref 70–99)
GLUCOSE BLDC GLUCOMTR-MCNC: 163 MG/DL — HIGH (ref 70–99)
GLUCOSE BLDC GLUCOMTR-MCNC: 60 MG/DL — LOW (ref 70–99)
GLUCOSE BLDC GLUCOMTR-MCNC: 66 MG/DL — LOW (ref 70–99)
GLUCOSE SERPL-MCNC: 65 MG/DL — LOW (ref 70–99)
HCT VFR BLD CALC: 25.8 % — LOW (ref 34.5–45)
HCV AB S/CO SERPL IA: 0.08 S/CO — SIGNIFICANT CHANGE UP (ref 0–0.99)
HCV AB SERPL-IMP: SIGNIFICANT CHANGE UP
HGB BLD-MCNC: 8 G/DL — LOW (ref 11.5–15.5)
MAGNESIUM SERPL-MCNC: 1.8 MG/DL — SIGNIFICANT CHANGE UP (ref 1.6–2.6)
MCHC RBC-ENTMCNC: 23.2 PG — LOW (ref 27–34)
MCHC RBC-ENTMCNC: 31 GM/DL — LOW (ref 32–36)
MCV RBC AUTO: 74.8 FL — LOW (ref 80–100)
NRBC # BLD: 0 /100 WBCS — SIGNIFICANT CHANGE UP (ref 0–0)
NRBC # FLD: 0 K/UL — SIGNIFICANT CHANGE UP (ref 0–0)
PHOSPHATE SERPL-MCNC: 3.5 MG/DL — SIGNIFICANT CHANGE UP (ref 2.5–4.5)
PLATELET # BLD AUTO: 424 K/UL — HIGH (ref 150–400)
POTASSIUM SERPL-MCNC: 3.7 MMOL/L — SIGNIFICANT CHANGE UP (ref 3.5–5.3)
POTASSIUM SERPL-SCNC: 3.7 MMOL/L — SIGNIFICANT CHANGE UP (ref 3.5–5.3)
RBC # BLD: 3.45 M/UL — LOW (ref 3.8–5.2)
RBC # FLD: 17.8 % — HIGH (ref 10.3–14.5)
SODIUM SERPL-SCNC: 136 MMOL/L — SIGNIFICANT CHANGE UP (ref 135–145)
WBC # BLD: 10.63 K/UL — HIGH (ref 3.8–10.5)
WBC # FLD AUTO: 10.63 K/UL — HIGH (ref 3.8–10.5)

## 2023-04-03 RX ORDER — MAGNESIUM SULFATE 500 MG/ML
2 VIAL (ML) INJECTION ONCE
Refills: 0 | Status: COMPLETED | OUTPATIENT
Start: 2023-04-03 | End: 2023-04-03

## 2023-04-03 RX ORDER — DEXTROSE 50 % IN WATER 50 %
12.5 SYRINGE (ML) INTRAVENOUS ONCE
Refills: 0 | Status: COMPLETED | OUTPATIENT
Start: 2023-04-03 | End: 2023-04-03

## 2023-04-03 RX ORDER — ACETAMINOPHEN 500 MG
975 TABLET ORAL EVERY 6 HOURS
Refills: 0 | Status: DISCONTINUED | OUTPATIENT
Start: 2023-04-03 | End: 2023-04-10

## 2023-04-03 RX ORDER — POTASSIUM CHLORIDE 20 MEQ
20 PACKET (EA) ORAL ONCE
Refills: 0 | Status: COMPLETED | OUTPATIENT
Start: 2023-04-03 | End: 2023-04-03

## 2023-04-03 RX ADMIN — PIPERACILLIN AND TAZOBACTAM 25 GRAM(S): 4; .5 INJECTION, POWDER, LYOPHILIZED, FOR SOLUTION INTRAVENOUS at 13:22

## 2023-04-03 RX ADMIN — SODIUM CHLORIDE 125 MILLILITER(S): 9 INJECTION, SOLUTION INTRAVENOUS at 10:13

## 2023-04-03 RX ADMIN — PIPERACILLIN AND TAZOBACTAM 25 GRAM(S): 4; .5 INJECTION, POWDER, LYOPHILIZED, FOR SOLUTION INTRAVENOUS at 22:16

## 2023-04-03 RX ADMIN — PIPERACILLIN AND TAZOBACTAM 25 GRAM(S): 4; .5 INJECTION, POWDER, LYOPHILIZED, FOR SOLUTION INTRAVENOUS at 06:21

## 2023-04-03 RX ADMIN — ENOXAPARIN SODIUM 75 MILLIGRAM(S): 100 INJECTION SUBCUTANEOUS at 17:39

## 2023-04-03 RX ADMIN — Medication 20 MILLIEQUIVALENT(S): at 10:13

## 2023-04-03 RX ADMIN — Medication 25 GRAM(S): at 10:13

## 2023-04-03 RX ADMIN — Medication 12.5 MILLILITER(S): at 10:13

## 2023-04-03 RX ADMIN — ENOXAPARIN SODIUM 75 MILLIGRAM(S): 100 INJECTION SUBCUTANEOUS at 06:20

## 2023-04-03 NOTE — PROVIDER CONTACT NOTE (HYPOGLYCEMIA EVENT) - NS PROVIDER CONTACT BACKGROUND-HYPO
Age: 72y    Gender: Female    POCT Blood Glucose:  103 mg/dL (04-03-23 @ 13:00)  163 mg/dL (04-03-23 @ 10:43)  60 mg/dL (04-03-23 @ 09:11)  66 mg/dL (04-03-23 @ 09:02)      eMAR:dextrose 50% Injectable   12.5 milliLiter(s) IV Push (04-03-23 @ 10:13)

## 2023-04-03 NOTE — PROVIDER CONTACT NOTE (HYPOGLYCEMIA EVENT) - NS PROVIDER CONTACT NOTE-TREATMENT-HYPO
4 oz apple juice/ April 3,2023/ 0905 AM/4 oz Fruit Juice (Specify quantity, date/time)/Dextrose 50% 12.5 Grams IV Push

## 2023-04-04 ENCOUNTER — TRANSCRIPTION ENCOUNTER (OUTPATIENT)
Age: 73
End: 2023-04-04

## 2023-04-04 LAB
ANION GAP SERPL CALC-SCNC: 10 MMOL/L — SIGNIFICANT CHANGE UP (ref 7–14)
BUN SERPL-MCNC: 5 MG/DL — LOW (ref 7–23)
CALCIUM SERPL-MCNC: 8.3 MG/DL — LOW (ref 8.4–10.5)
CHLORIDE SERPL-SCNC: 106 MMOL/L — SIGNIFICANT CHANGE UP (ref 98–107)
CO2 SERPL-SCNC: 22 MMOL/L — SIGNIFICANT CHANGE UP (ref 22–31)
CREAT SERPL-MCNC: 0.78 MG/DL — SIGNIFICANT CHANGE UP (ref 0.5–1.3)
EGFR: 82 ML/MIN/1.73M2 — SIGNIFICANT CHANGE UP
GLUCOSE SERPL-MCNC: 96 MG/DL — SIGNIFICANT CHANGE UP (ref 70–99)
HCT VFR BLD CALC: 27.7 % — LOW (ref 34.5–45)
HGB BLD-MCNC: 8.5 G/DL — LOW (ref 11.5–15.5)
MAGNESIUM SERPL-MCNC: 2.1 MG/DL — SIGNIFICANT CHANGE UP (ref 1.6–2.6)
MCHC RBC-ENTMCNC: 23.4 PG — LOW (ref 27–34)
MCHC RBC-ENTMCNC: 30.7 GM/DL — LOW (ref 32–36)
MCV RBC AUTO: 76.1 FL — LOW (ref 80–100)
NRBC # BLD: 0 /100 WBCS — SIGNIFICANT CHANGE UP (ref 0–0)
NRBC # FLD: 0 K/UL — SIGNIFICANT CHANGE UP (ref 0–0)
PHOSPHATE SERPL-MCNC: 2.6 MG/DL — SIGNIFICANT CHANGE UP (ref 2.5–4.5)
PLATELET # BLD AUTO: 466 K/UL — HIGH (ref 150–400)
POTASSIUM SERPL-MCNC: 3.9 MMOL/L — SIGNIFICANT CHANGE UP (ref 3.5–5.3)
POTASSIUM SERPL-SCNC: 3.9 MMOL/L — SIGNIFICANT CHANGE UP (ref 3.5–5.3)
RBC # BLD: 3.64 M/UL — LOW (ref 3.8–5.2)
RBC # FLD: 18.5 % — HIGH (ref 10.3–14.5)
SODIUM SERPL-SCNC: 138 MMOL/L — SIGNIFICANT CHANGE UP (ref 135–145)
WBC # BLD: 10.58 K/UL — HIGH (ref 3.8–10.5)
WBC # FLD AUTO: 10.58 K/UL — HIGH (ref 3.8–10.5)

## 2023-04-04 PROCEDURE — 99231 SBSQ HOSP IP/OBS SF/LOW 25: CPT

## 2023-04-04 RX ORDER — APIXABAN 2.5 MG/1
10 TABLET, FILM COATED ORAL
Refills: 0 | Status: DISCONTINUED | OUTPATIENT
Start: 2023-04-04 | End: 2023-04-06

## 2023-04-04 RX ORDER — RIVAROXABAN 15 MG-20MG
1 KIT ORAL
Qty: 30 | Refills: 0
Start: 2023-04-04

## 2023-04-04 RX ORDER — APIXABAN 2.5 MG/1
2 TABLET, FILM COATED ORAL
Qty: 74 | Refills: 0
Start: 2023-04-04 | End: 2023-04-10

## 2023-04-04 RX ORDER — APIXABAN 2.5 MG/1
1 TABLET, FILM COATED ORAL
Qty: 30 | Refills: 0
Start: 2023-04-04 | End: 2023-05-03

## 2023-04-04 RX ORDER — ENOXAPARIN SODIUM 100 MG/ML
80 INJECTION SUBCUTANEOUS
Refills: 0
Start: 2023-04-04

## 2023-04-04 RX ORDER — ENOXAPARIN SODIUM 100 MG/ML
150 INJECTION SUBCUTANEOUS
Qty: 30 | Refills: 0
Start: 2023-04-04 | End: 2023-05-03

## 2023-04-04 RX ORDER — APIXABAN 2.5 MG/1
5 TABLET, FILM COATED ORAL
Refills: 0 | Status: DISCONTINUED | OUTPATIENT
Start: 2023-04-04 | End: 2023-04-04

## 2023-04-04 RX ORDER — ACETAMINOPHEN 500 MG
3 TABLET ORAL
Qty: 0 | Refills: 0 | DISCHARGE
Start: 2023-04-04

## 2023-04-04 RX ADMIN — PIPERACILLIN AND TAZOBACTAM 25 GRAM(S): 4; .5 INJECTION, POWDER, LYOPHILIZED, FOR SOLUTION INTRAVENOUS at 22:06

## 2023-04-04 RX ADMIN — APIXABAN 10 MILLIGRAM(S): 2.5 TABLET, FILM COATED ORAL at 19:25

## 2023-04-04 RX ADMIN — PIPERACILLIN AND TAZOBACTAM 25 GRAM(S): 4; .5 INJECTION, POWDER, LYOPHILIZED, FOR SOLUTION INTRAVENOUS at 13:22

## 2023-04-04 RX ADMIN — PIPERACILLIN AND TAZOBACTAM 25 GRAM(S): 4; .5 INJECTION, POWDER, LYOPHILIZED, FOR SOLUTION INTRAVENOUS at 06:04

## 2023-04-04 RX ADMIN — ENOXAPARIN SODIUM 75 MILLIGRAM(S): 100 INJECTION SUBCUTANEOUS at 06:04

## 2023-04-04 NOTE — DISCHARGE NOTE PROVIDER - NSDCMRMEDTOKEN_GEN_ALL_CORE_FT
acetaminophen 325 mg oral tablet: 3 tab(s) orally every 6 hours As needed Mild Pain (1 - 3)  Eliquis Starter Pack for Treatment of DVT and PE 5 mg oral tablet: 1 tab(s) orally 2 times a day   acetaminophen 325 mg oral tablet: 3 tab(s) orally every 6 hours As needed Mild Pain (1 - 3)  Eliquis Starter Pack for Treatment of DVT and PE 5 mg oral tablet: 1 tab(s) orally 2 times a day  Lovenox 150 mg/mL injectable solution: 150 milligram(s) subcutaneously once a day  Xarelto Starter Pack 15 mg-20 mg oral kit: 1 tab(s) orally once a day Please take according to package   acetaminophen 325 mg oral tablet: 3 tab(s) orally every 6 hours As needed Mild Pain (1 - 3)  Eliquis Starter Pack for Treatment of DVT and PE 5 mg oral tablet: 2 tab(s) orally 2 times a day for 7 days. After please take 1 tab (5mg) two times a day for 30 days   acetaminophen 325 mg oral tablet: 3 tab(s) orally every 6 hours As needed Mild Pain (1 - 3)  ciprofloxacin 500 mg oral tablet: 1 tab(s) orally 2 times a day  Eliquis Starter Pack for Treatment of DVT and PE 5 mg oral tablet: 2 tab(s) orally 2 times a day for 7 days. After please take 1 tab (5mg) two times a day for 30 days  metroNIDAZOLE 500 mg oral tablet: 1 tab(s) orally every 8 hours

## 2023-04-04 NOTE — DISCHARGE NOTE PROVIDER - NSDCFUADDINST_GEN_ALL_CORE_FT
ACTIVITY: No heavy lifting or straining. Otherwise, you may return to your usual level of physical activity. If you are taking narcotic pain medication DO NOT drive a car, operate machinery or make important decisions.  DIET: Return to your usual diet.  NOTIFY YOUR SURGEON IF YOU HAVE: any bleeding that does not stop, any pus draining from your wound(s), any fever (over 100.4 F) persistent nausea/vomiting, or if your pain is not controlled on your discharge pain medications, unable to urinate.  Please follow up with your primary care physician in one week regarding your hospitalization, bring copies of your discharge paperwork.  Please follow up with your surgeon, Dr. Merrill as an outpatient, please call to schedule appointment

## 2023-04-04 NOTE — DISCHARGE NOTE PROVIDER - CARE PROVIDER_API CALL
Lorne Merrill)  ColonRectal Surgery; Surgery  [facility], [address] [place] [address]  Phone: [phone]  Fax: [phone]  Follow Up Time: 2 weeks

## 2023-04-04 NOTE — DISCHARGE NOTE PROVIDER - NSDCCPCAREPLAN_GEN_ALL_CORE_FT
PRINCIPAL DISCHARGE DIAGNOSIS  Diagnosis: Diverticulitis of large intestine with complication  Assessment and Plan of Treatment:       SECONDARY DISCHARGE DIAGNOSES  Diagnosis: Pulmonary embolism  Assessment and Plan of Treatment:

## 2023-04-04 NOTE — DISCHARGE NOTE PROVIDER - HOSPITAL COURSE
72-year-old female with no reported past medical history, psh *2 presents to the ED with 2-week history of left lower quadrant abdominal pain.  Pain does not radiate anywhere else.  Patient endorses associated nausea, vomiting, diarrhea and poor p.o  tolerance. Patient states saw blood with BM twice in the past 2 weeks, obvious weight loss within last 2 weeks also.  She denies any fevers, chills, headaches, focal neurologic deficits, numbness, or tingling.  Last colonoscopy within last 5-10 years, polyps were found per patient. Patient also endorse she has chronic anemia but does not know baseline Hg.     In ER,  patient is afebrile, HDS. LLQ tenderness, no peritoneal sign, WBC 11.4, Hg 7.3, CTAP showed  descending and proximal sigmoid colon inflammation with air containing extraluminal collection, possible  diverticulitis, inflammatory bowel disease or neoplasm. Besides, acute RLL PE captured on CTAP.     Patient admitted to SICU and placed on Heparin gtt for treatment of PE. She was kept NPO and placed on IV abx for her diverticulitis. Once she was HD stable, she was transferred to surgical floor for ongoing care.   Once the patient's abdominal pain began to improve, her diet was then restarted and slowly advanced as tolerated.  As of HD # 4, the patient's pain had resolved and she was tolerating a regular diet without nausea, vomiting, or abdominal pain.  He was voiding well and ambulating without difficulty at the time of discharge. She will need to continue anticoagulation upon discharge for treatment of her PE and will follow-up with surgery and her medical doctor regarding further monitoring of her chronic anemia and PE. 72-year-old female with no reported past medical history, psh *2 presents to the ED with 2-week history of left lower quadrant abdominal pain.  Pain does not radiate anywhere else.  Patient endorses associated nausea, vomiting, diarrhea and poor p.o  tolerance. Patient states saw blood with BM twice in the past 2 weeks, obvious weight loss within last 2 weeks also.  She denies any fevers, chills, headaches, focal neurologic deficits, numbness, or tingling.  Last colonoscopy within last 5-10 years, polyps were found per patient. Patient also endorse she has chronic anemia but does not know baseline Hg.     In ER,  patient is afebrile, HDS. LLQ tenderness, no peritoneal sign, WBC 11.4, Hg 7.3, CTAP showed  descending and proximal sigmoid colon inflammation with air containing extraluminal collection, possible  diverticulitis, inflammatory bowel disease or neoplasm. Besides, acute RLL PE captured on CTAP.     Patient admitted to SICU and placed on Heparin gtt for treatment of PE. She was kept NPO and placed on IV abx for her diverticulitis. Once she was HD stable, she was transferred to surgical floor for ongoing care.   Once the patient's abdominal pain began to improve, her diet was then restarted and slowly advanced as tolerated.      Repeat CT 23 showed fluid/air collections. IR consulted for possible drainage of collection. Upon review of imaging, there is a collection that could possibly be drained. However, given that patient last received eliquis this morning, would need to wait until 4/10 on Monday to do procedure.     4/10: Patient brought to CT for pelvic drainage. CT obtained demonstrated significant interval decrease in pelvic fluid without window amenable to drainage. Drainage deferred at this time.    Patient clinically improving on antibiotics.    The patient's pain had resolved and she was tolerating a regular diet without nausea, vomiting, or abdominal pain.  She was voiding well and ambulating without difficulty at the time of discharge. She will need to continue anticoagulation upon discharge for treatment of her PE and will follow-up with surgery and her medical doctor regarding further monitoring of her chronic anemia and PE.

## 2023-04-04 NOTE — DISCHARGE NOTE PROVIDER - NSDCFUADDAPPT_GEN_ALL_CORE_FT
Please follow up with your surgeon, Dr. Merrill as an outpatient, please call to schedule appointment within 7 -10 days Please follow up with your surgeon, Dr. Merrill as an outpatient, please call to schedule appointment next Tuesday (4/18)

## 2023-04-05 LAB
ANION GAP SERPL CALC-SCNC: 7 MMOL/L — SIGNIFICANT CHANGE UP (ref 7–14)
BUN SERPL-MCNC: 5 MG/DL — LOW (ref 7–23)
CALCIUM SERPL-MCNC: 8.1 MG/DL — LOW (ref 8.4–10.5)
CHLORIDE SERPL-SCNC: 109 MMOL/L — HIGH (ref 98–107)
CO2 SERPL-SCNC: 24 MMOL/L — SIGNIFICANT CHANGE UP (ref 22–31)
CREAT SERPL-MCNC: 0.77 MG/DL — SIGNIFICANT CHANGE UP (ref 0.5–1.3)
EGFR: 83 ML/MIN/1.73M2 — SIGNIFICANT CHANGE UP
GLUCOSE SERPL-MCNC: 100 MG/DL — HIGH (ref 70–99)
HCT VFR BLD CALC: 24.9 % — LOW (ref 34.5–45)
HGB BLD-MCNC: 7.6 G/DL — LOW (ref 11.5–15.5)
MAGNESIUM SERPL-MCNC: 2 MG/DL — SIGNIFICANT CHANGE UP (ref 1.6–2.6)
MCHC RBC-ENTMCNC: 23.1 PG — LOW (ref 27–34)
MCHC RBC-ENTMCNC: 30.5 GM/DL — LOW (ref 32–36)
MCV RBC AUTO: 75.7 FL — LOW (ref 80–100)
NRBC # BLD: 0 /100 WBCS — SIGNIFICANT CHANGE UP (ref 0–0)
NRBC # FLD: 0 K/UL — SIGNIFICANT CHANGE UP (ref 0–0)
PHOSPHATE SERPL-MCNC: 2.9 MG/DL — SIGNIFICANT CHANGE UP (ref 2.5–4.5)
PLATELET # BLD AUTO: 425 K/UL — HIGH (ref 150–400)
POTASSIUM SERPL-MCNC: 3.8 MMOL/L — SIGNIFICANT CHANGE UP (ref 3.5–5.3)
POTASSIUM SERPL-SCNC: 3.8 MMOL/L — SIGNIFICANT CHANGE UP (ref 3.5–5.3)
RBC # BLD: 3.29 M/UL — LOW (ref 3.8–5.2)
RBC # FLD: 18.6 % — HIGH (ref 10.3–14.5)
SODIUM SERPL-SCNC: 140 MMOL/L — SIGNIFICANT CHANGE UP (ref 135–145)
WBC # BLD: 7.92 K/UL — SIGNIFICANT CHANGE UP (ref 3.8–10.5)
WBC # FLD AUTO: 7.92 K/UL — SIGNIFICANT CHANGE UP (ref 3.8–10.5)

## 2023-04-05 RX ADMIN — APIXABAN 10 MILLIGRAM(S): 2.5 TABLET, FILM COATED ORAL at 06:55

## 2023-04-05 RX ADMIN — PIPERACILLIN AND TAZOBACTAM 25 GRAM(S): 4; .5 INJECTION, POWDER, LYOPHILIZED, FOR SOLUTION INTRAVENOUS at 06:55

## 2023-04-05 RX ADMIN — PIPERACILLIN AND TAZOBACTAM 25 GRAM(S): 4; .5 INJECTION, POWDER, LYOPHILIZED, FOR SOLUTION INTRAVENOUS at 19:18

## 2023-04-05 RX ADMIN — APIXABAN 10 MILLIGRAM(S): 2.5 TABLET, FILM COATED ORAL at 19:19

## 2023-04-05 NOTE — CHART NOTE - NSCHARTNOTEFT_GEN_A_CORE
In visiting with roommate, patient verbalized she would like to speak with a dietitian for diet education.      Chart was reviewed - patient currently on Low Fiber diet, s/p diverticulitis. Verbal and written diet education given regarding same. Patient to be discharged today and to follow-up outpatient.      Evy Oliver, MS, RDN, CDN  Pager [phone]  Also available on MS Teams In visiting with roommate, patient verbalized she would like to speak with a dietitian for diet education.      Chart was reviewed - patient currently on Low Fiber diet, s/p diverticulitis. Verbal and written diet education provided to patient regarding same. Patient reported to be discharging home today.       Evy Oliver, MS, RDN, CDN  Pager [phone]  Also available on MS Teams

## 2023-04-06 LAB
ANION GAP SERPL CALC-SCNC: 10 MMOL/L — SIGNIFICANT CHANGE UP (ref 7–14)
BUN SERPL-MCNC: 5 MG/DL — LOW (ref 7–23)
CALCIUM SERPL-MCNC: 8.2 MG/DL — LOW (ref 8.4–10.5)
CHLORIDE SERPL-SCNC: 108 MMOL/L — HIGH (ref 98–107)
CO2 SERPL-SCNC: 22 MMOL/L — SIGNIFICANT CHANGE UP (ref 22–31)
CREAT SERPL-MCNC: 0.74 MG/DL — SIGNIFICANT CHANGE UP (ref 0.5–1.3)
CULTURE RESULTS: SIGNIFICANT CHANGE UP
CULTURE RESULTS: SIGNIFICANT CHANGE UP
EGFR: 87 ML/MIN/1.73M2 — SIGNIFICANT CHANGE UP
GLUCOSE BLDC GLUCOMTR-MCNC: 116 MG/DL — HIGH (ref 70–99)
GLUCOSE SERPL-MCNC: 106 MG/DL — HIGH (ref 70–99)
HCT VFR BLD CALC: 26.9 % — LOW (ref 34.5–45)
HGB BLD-MCNC: 8.3 G/DL — LOW (ref 11.5–15.5)
MAGNESIUM SERPL-MCNC: 2 MG/DL — SIGNIFICANT CHANGE UP (ref 1.6–2.6)
MCHC RBC-ENTMCNC: 23.6 PG — LOW (ref 27–34)
MCHC RBC-ENTMCNC: 30.9 GM/DL — LOW (ref 32–36)
MCV RBC AUTO: 76.4 FL — LOW (ref 80–100)
NRBC # BLD: 0 /100 WBCS — SIGNIFICANT CHANGE UP (ref 0–0)
NRBC # FLD: 0 K/UL — SIGNIFICANT CHANGE UP (ref 0–0)
PHOSPHATE SERPL-MCNC: 2.8 MG/DL — SIGNIFICANT CHANGE UP (ref 2.5–4.5)
PLATELET # BLD AUTO: 443 K/UL — HIGH (ref 150–400)
POTASSIUM SERPL-MCNC: 3.9 MMOL/L — SIGNIFICANT CHANGE UP (ref 3.5–5.3)
POTASSIUM SERPL-SCNC: 3.9 MMOL/L — SIGNIFICANT CHANGE UP (ref 3.5–5.3)
RBC # BLD: 3.52 M/UL — LOW (ref 3.8–5.2)
RBC # FLD: 19.3 % — HIGH (ref 10.3–14.5)
SODIUM SERPL-SCNC: 140 MMOL/L — SIGNIFICANT CHANGE UP (ref 135–145)
SPECIMEN SOURCE: SIGNIFICANT CHANGE UP
SPECIMEN SOURCE: SIGNIFICANT CHANGE UP
WBC # BLD: 10.93 K/UL — HIGH (ref 3.8–10.5)
WBC # FLD AUTO: 10.93 K/UL — HIGH (ref 3.8–10.5)

## 2023-04-06 PROCEDURE — 74177 CT ABD & PELVIS W/CONTRAST: CPT | Mod: 26

## 2023-04-06 RX ADMIN — PIPERACILLIN AND TAZOBACTAM 25 GRAM(S): 4; .5 INJECTION, POWDER, LYOPHILIZED, FOR SOLUTION INTRAVENOUS at 21:45

## 2023-04-06 RX ADMIN — APIXABAN 10 MILLIGRAM(S): 2.5 TABLET, FILM COATED ORAL at 05:59

## 2023-04-06 RX ADMIN — PIPERACILLIN AND TAZOBACTAM 25 GRAM(S): 4; .5 INJECTION, POWDER, LYOPHILIZED, FOR SOLUTION INTRAVENOUS at 06:00

## 2023-04-06 RX ADMIN — PIPERACILLIN AND TAZOBACTAM 25 GRAM(S): 4; .5 INJECTION, POWDER, LYOPHILIZED, FOR SOLUTION INTRAVENOUS at 13:41

## 2023-04-06 NOTE — CONSULT NOTE ADULT - SUBJECTIVE AND OBJECTIVE BOX
Interventional Radiology    Evaluate for Procedure: Drainage of possible collection    HPI: This is a 72 year-old female who p/w LLQ abd pain and diarrhea for 2 weeks.  CTAP 4/1/23 showed contained perforation from acute diverticulitis or neoplasm as well as incidental finding of right lower lobe acute PE.  Repeat CT 4/6/23 showed fluid/air collections. Patient clinically improving on antibiotics. Patient last received eliquis at 6AM on 4/6/23.  IR consulted for possible drainage of collection.    Allergies: No Known Allergies    Medications (Abx/Cardiac/Anticoagulation/Blood Products)    apixaban: 10 milliGRAM(s) Oral (04-06 @ 05:59)  piperacillin/tazobactam IVPB..: 25 mL/Hr IV Intermittent (04-06 @ 13:41)    Data:    T(C): 37.3  HR: 71  BP: 123/64  RR: 18  SpO2: 98%    -WBC 10.93 / HgB 8.3 / Hct 26.9 / Plt 443  -Na 140 / Cl 108 / BUN 5 / Glucose 106  -K 3.9 / CO2 22 / Cr 0.74        Radiology:   CTAP 4/6/23:  IMPRESSION:  Mural thickening and extensive inflammation involving long segment descending colon and proximal sigmoid colon as previously demonstrated.  Associated gas containing abscess abutting the left psoas muscle which is also involved by smaller abscesses.  Moderate left hydroureteronephrosis to the level of the inflammatory process in the pelvis.  Partially visualized right lower lobe pulmonary emboli as previously demonstrated.    CTAP 4/1/23:  IMPRESSION:  Acute right lower lobe segmental pulmonary embolus.  Marked inflammation involving the descending and proximal sigmoid colon for which the differential diagnosis includes diverticulitis, inflammatory bowel disease and neoplasm. Extraluminal sinus tracts containing air as well as a thick walled extraluminal collection containing mostly air in the left lower quadrant.  Questionable nonocclusive thrombus in the left portal vein.    Assessment/Plan:   Case and imaging reviewed. This is a 72 year-old female who p/w LLQ abd pain and diarrhea for 2 weeks.  CTAP 4/1/23 showed contained perforation from acute diverticulitis or neoplasm as well as incidental finding of right lower lobe acute PE.  Repeat CT 4/6/23 showed fluid/air collections. Patient clinically improving on antibiotics. Patient last received eliquis at 6AM on 4/6/23.  IR consulted for possible drainage of collection. Upon review of imaging, there is a collection that could possibly be drained. However, given that patient last received eliquis this morning, would need to wait until 4/10 on Monday to do procedure. It is up to the primary team if they would like IR to do possible drainage.   -- Will wait to hear back from surgery regarding possible drainage

## 2023-04-07 LAB
ANION GAP SERPL CALC-SCNC: 9 MMOL/L — SIGNIFICANT CHANGE UP (ref 7–14)
APTT BLD: 34 SEC — SIGNIFICANT CHANGE UP (ref 27–36.3)
BUN SERPL-MCNC: 5 MG/DL — LOW (ref 7–23)
CALCIUM SERPL-MCNC: 8.6 MG/DL — SIGNIFICANT CHANGE UP (ref 8.4–10.5)
CHLORIDE SERPL-SCNC: 107 MMOL/L — SIGNIFICANT CHANGE UP (ref 98–107)
CO2 SERPL-SCNC: 24 MMOL/L — SIGNIFICANT CHANGE UP (ref 22–31)
CREAT SERPL-MCNC: 0.79 MG/DL — SIGNIFICANT CHANGE UP (ref 0.5–1.3)
EGFR: 80 ML/MIN/1.73M2 — SIGNIFICANT CHANGE UP
GLUCOSE SERPL-MCNC: 100 MG/DL — HIGH (ref 70–99)
HCT VFR BLD CALC: 27 % — LOW (ref 34.5–45)
HGB BLD-MCNC: 8.3 G/DL — LOW (ref 11.5–15.5)
INR BLD: 1.93 RATIO — HIGH (ref 0.88–1.16)
MAGNESIUM SERPL-MCNC: 2.1 MG/DL — SIGNIFICANT CHANGE UP (ref 1.6–2.6)
MCHC RBC-ENTMCNC: 22.8 PG — LOW (ref 27–34)
MCHC RBC-ENTMCNC: 30.7 GM/DL — LOW (ref 32–36)
MCV RBC AUTO: 74.2 FL — LOW (ref 80–100)
NRBC # BLD: 0 /100 WBCS — SIGNIFICANT CHANGE UP (ref 0–0)
NRBC # FLD: 0 K/UL — SIGNIFICANT CHANGE UP (ref 0–0)
PHOSPHATE SERPL-MCNC: 3.8 MG/DL — SIGNIFICANT CHANGE UP (ref 2.5–4.5)
PLATELET # BLD AUTO: 450 K/UL — HIGH (ref 150–400)
POTASSIUM SERPL-MCNC: 3.9 MMOL/L — SIGNIFICANT CHANGE UP (ref 3.5–5.3)
POTASSIUM SERPL-SCNC: 3.9 MMOL/L — SIGNIFICANT CHANGE UP (ref 3.5–5.3)
PROTHROM AB SERPL-ACNC: 22.5 SEC — HIGH (ref 10.5–13.4)
RBC # BLD: 3.64 M/UL — LOW (ref 3.8–5.2)
RBC # FLD: 19 % — HIGH (ref 10.3–14.5)
SODIUM SERPL-SCNC: 140 MMOL/L — SIGNIFICANT CHANGE UP (ref 135–145)
WBC # BLD: 12.55 K/UL — HIGH (ref 3.8–10.5)
WBC # FLD AUTO: 12.55 K/UL — HIGH (ref 3.8–10.5)

## 2023-04-07 RX ORDER — ENOXAPARIN SODIUM 100 MG/ML
40 INJECTION SUBCUTANEOUS EVERY 24 HOURS
Refills: 0 | Status: DISCONTINUED | OUTPATIENT
Start: 2023-04-07 | End: 2023-04-07

## 2023-04-07 RX ORDER — ENOXAPARIN SODIUM 100 MG/ML
75 INJECTION SUBCUTANEOUS EVERY 12 HOURS
Refills: 0 | Status: COMPLETED | OUTPATIENT
Start: 2023-04-07 | End: 2023-04-08

## 2023-04-07 RX ADMIN — Medication 975 MILLIGRAM(S): at 18:12

## 2023-04-07 RX ADMIN — PIPERACILLIN AND TAZOBACTAM 25 GRAM(S): 4; .5 INJECTION, POWDER, LYOPHILIZED, FOR SOLUTION INTRAVENOUS at 14:15

## 2023-04-07 RX ADMIN — PIPERACILLIN AND TAZOBACTAM 25 GRAM(S): 4; .5 INJECTION, POWDER, LYOPHILIZED, FOR SOLUTION INTRAVENOUS at 21:39

## 2023-04-07 RX ADMIN — Medication 975 MILLIGRAM(S): at 17:42

## 2023-04-07 RX ADMIN — PIPERACILLIN AND TAZOBACTAM 25 GRAM(S): 4; .5 INJECTION, POWDER, LYOPHILIZED, FOR SOLUTION INTRAVENOUS at 05:09

## 2023-04-07 RX ADMIN — ENOXAPARIN SODIUM 75 MILLIGRAM(S): 100 INJECTION SUBCUTANEOUS at 17:41

## 2023-04-07 NOTE — CONSULT NOTE ADULT - SUBJECTIVE AND OBJECTIVE BOX
Interventional Radiology    Evaluate for Procedure: Drainage of abdominal collection    HPI: This is a 72 year-old female who p/w LLQ abd pain and diarrhea for 2 weeks.  CTAP 4/1/23 showed contained perforation from acute diverticulitis or neoplasm as well as incidental finding of right lower lobe acute PE.  Repeat CT 4/6/23 showed fluid/air collections. Patient clinically improving on antibiotics. Patient last received eliquis at 6AM on 4/6/23.  IR consulted for possible drainage of collection.    Allergies: No Known Allergies    Medications (Abx/Cardiac/Anticoagulation/Blood Products)    apixaban: 10 milliGRAM(s) Oral (04-06 @ 05:59)  piperacillin/tazobactam IVPB..: 25 mL/Hr IV Intermittent (04-07 @ 05:09)  enoxaparin 75mg subq q12h    Data:    T(C): 36.7  HR: 81  BP: 107/54  RR: 18  SpO2: 100%    -WBC 12.55 / HgB 8.3 / Hct 27.0 / Plt 450  -Na 140 / Cl 107 / BUN 5 / Glucose 100  -K 3.9 / CO2 24 / Cr 0.79      Radiology:   CTAP 4/6/23:  IMPRESSION:  Mural thickening and extensive inflammation involving long segment descending colon and proximal sigmoid colon as previously demonstrated.  Associated gas containing abscess abutting the left psoas muscle which is also involved by smaller abscesses.  Moderate left hydroureteronephrosis to the level of the inflammatory process in the pelvis.  Partially visualized right lower lobe pulmonary emboli as previously demonstrated.    CTAP 4/1/23:  IMPRESSION:  Acute right lower lobe segmental pulmonary embolus.  Marked inflammation involving the descending and proximal sigmoid colon for which the differential diagnosis includes diverticulitis, inflammatory bowel disease and neoplasm. Extraluminal sinus tracts containing air as well as a thick walled extraluminal collection containing mostly air in the left lower quadrant.  Questionable nonocclusive thrombus in the left portal vein.    Assessment/Plan:   This is a 72 year-old female who p/w LLQ abd pain and diarrhea for 2 weeks.  CTAP 4/1/23 showed contained perforation from acute diverticulitis or neoplasm as well as incidental finding of right lower lobe acute PE.  Repeat CT 4/6/23 showed fluid/air collections. Patient clinically improving on antibiotics. Patient last received eliquis at 6AM on 4/6/23. Patient is currently on therapeutic lovenox for acute PE.  IR consulted for possible drainage of collection.    -- IR will plan to perform drainage of collection on 4/10/23  -- NPO at midnight on the day prior on 4/9/23  -- please hold lovenox 24 hrs prior to procedure (discontinue on Sunday 4/9/23 in AM) and hold any AM prophylactic anticoagulation on 4/10/23  -- please obtain CBC, BMP, Coags in AM on 4/10/23  -- please obtain COVID test  -- please complete IR pre-procedure note  -- please place IR procedure request order under Dr. Gamez    --  Segundo Cook DO/WILL, PGY-4  Vascular and Interventional Radiology   Available on [employer] Teams    - Non-emergent consults: Place IR consult order in [place]  - Emergent issues (pager): [facility] [phone]; [facility] [phone]; [phone]  - Scheduling questions: [facility] [phone]; [facility] [phone]  - Clinic/outpatient booking: [facility] [phone]; [facility] [phone] Interventional Radiology    Evaluate for Procedure: Drainage of abdominal collection    HPI: This is a 72 year-old female who p/w LLQ abd pain and diarrhea for 2 weeks.  CTAP 4/1/23 showed contained perforation from acute diverticulitis or neoplasm as well as incidental finding of right lower lobe acute PE.  Repeat CT 4/6/23 showed fluid/air collections. Patient clinically improving on antibiotics. Patient last received eliquis at 6AM on 4/6/23.  IR consulted for possible drainage of collection.    Allergies: No Known Allergies    Medications (Abx/Cardiac/Anticoagulation/Blood Products)    apixaban: 10 milliGRAM(s) Oral (04-06 @ 05:59)  piperacillin/tazobactam IVPB..: 25 mL/Hr IV Intermittent (04-07 @ 05:09)  enoxaparin 75mg subq q12h    Data:    T(C): 36.7  HR: 81  BP: 107/54  RR: 18  SpO2: 100%    -WBC 12.55 / HgB 8.3 / Hct 27.0 / Plt 450  -Na 140 / Cl 107 / BUN 5 / Glucose 100  -K 3.9 / CO2 24 / Cr 0.79      Radiology:   CTAP 4/6/23:  IMPRESSION:  Mural thickening and extensive inflammation involving long segment descending colon and proximal sigmoid colon as previously demonstrated.  Associated gas containing abscess abutting the left psoas muscle which is also involved by smaller abscesses.  Moderate left hydroureteronephrosis to the level of the inflammatory process in the pelvis.  Partially visualized right lower lobe pulmonary emboli as previously demonstrated.    CTAP 4/1/23:  IMPRESSION:  Acute right lower lobe segmental pulmonary embolus.  Marked inflammation involving the descending and proximal sigmoid colon for which the differential diagnosis includes diverticulitis, inflammatory bowel disease and neoplasm. Extraluminal sinus tracts containing air as well as a thick walled extraluminal collection containing mostly air in the left lower quadrant.  Questionable nonocclusive thrombus in the left portal vein.    Assessment/Plan:   This is a 72 year-old female who p/w LLQ abd pain and diarrhea for 2 weeks.  CTAP 4/1/23 showed contained perforation from acute diverticulitis or neoplasm as well as incidental finding of right lower lobe acute PE.  Repeat CT 4/6/23 showed fluid/air collections. Patient clinically improving on antibiotics. Patient last received eliquis at 6AM on 4/6/23. Patient is currently on therapeutic lovenox for acute PE.  IR consulted for possible drainage of collection.    -- IR will plan to perform drainage of collection on 4/10/23  -- NPO at midnight on the day prior on 4/9/23  -- please hold lovenox 24 hrs prior to procedure (discontinue on Sunday 4/9/23 in AM) and hold any AM prophylactic anticoagulation on 4/10/23  -- please obtain CBC, BMP, Coags in AM on 4/10/23  -- please obtain COVID test  -- please complete IR pre-procedure note  -- please place IR procedure request order under Dr. Gamez

## 2023-04-07 NOTE — CONSULT NOTE ADULT - REASON FOR ADMISSION
perforated acute diverticulitis with acute PE

## 2023-04-07 NOTE — PROVIDER CONTACT NOTE (OTHER) - ACTION/TREATMENT ORDERED:
provider made aware, no new orders, just monitor for now
provider made aware, no new orders, just monitor for now

## 2023-04-08 LAB
ANION GAP SERPL CALC-SCNC: 10 MMOL/L — SIGNIFICANT CHANGE UP (ref 7–14)
APTT BLD: 34.9 SEC — SIGNIFICANT CHANGE UP (ref 27–36.3)
BUN SERPL-MCNC: 8 MG/DL — SIGNIFICANT CHANGE UP (ref 7–23)
CALCIUM SERPL-MCNC: 8.2 MG/DL — LOW (ref 8.4–10.5)
CHLORIDE SERPL-SCNC: 106 MMOL/L — SIGNIFICANT CHANGE UP (ref 98–107)
CO2 SERPL-SCNC: 24 MMOL/L — SIGNIFICANT CHANGE UP (ref 22–31)
CREAT SERPL-MCNC: 0.8 MG/DL — SIGNIFICANT CHANGE UP (ref 0.5–1.3)
EGFR: 79 ML/MIN/1.73M2 — SIGNIFICANT CHANGE UP
GLUCOSE BLDC GLUCOMTR-MCNC: 134 MG/DL — HIGH (ref 70–99)
GLUCOSE SERPL-MCNC: 105 MG/DL — HIGH (ref 70–99)
HCT VFR BLD CALC: 25 % — LOW (ref 34.5–45)
HGB BLD-MCNC: 7.7 G/DL — LOW (ref 11.5–15.5)
INR BLD: 1.61 RATIO — HIGH (ref 0.88–1.16)
MAGNESIUM SERPL-MCNC: 2.3 MG/DL — SIGNIFICANT CHANGE UP (ref 1.6–2.6)
MCHC RBC-ENTMCNC: 23.3 PG — LOW (ref 27–34)
MCHC RBC-ENTMCNC: 30.8 GM/DL — LOW (ref 32–36)
MCV RBC AUTO: 75.5 FL — LOW (ref 80–100)
NRBC # BLD: 0 /100 WBCS — SIGNIFICANT CHANGE UP (ref 0–0)
NRBC # FLD: 0 K/UL — SIGNIFICANT CHANGE UP (ref 0–0)
PHOSPHATE SERPL-MCNC: 3.3 MG/DL — SIGNIFICANT CHANGE UP (ref 2.5–4.5)
PLATELET # BLD AUTO: 374 K/UL — SIGNIFICANT CHANGE UP (ref 150–400)
POTASSIUM SERPL-MCNC: 3.6 MMOL/L — SIGNIFICANT CHANGE UP (ref 3.5–5.3)
POTASSIUM SERPL-SCNC: 3.6 MMOL/L — SIGNIFICANT CHANGE UP (ref 3.5–5.3)
PROTHROM AB SERPL-ACNC: 18.8 SEC — HIGH (ref 10.5–13.4)
RBC # BLD: 3.31 M/UL — LOW (ref 3.8–5.2)
RBC # FLD: 19.3 % — HIGH (ref 10.3–14.5)
SARS-COV-2 RNA SPEC QL NAA+PROBE: SIGNIFICANT CHANGE UP
SODIUM SERPL-SCNC: 140 MMOL/L — SIGNIFICANT CHANGE UP (ref 135–145)
WBC # BLD: 9.66 K/UL — SIGNIFICANT CHANGE UP (ref 3.8–10.5)
WBC # FLD AUTO: 9.66 K/UL — SIGNIFICANT CHANGE UP (ref 3.8–10.5)

## 2023-04-08 PROCEDURE — 99231 SBSQ HOSP IP/OBS SF/LOW 25: CPT

## 2023-04-08 RX ORDER — POTASSIUM CHLORIDE 20 MEQ
20 PACKET (EA) ORAL
Refills: 0 | Status: COMPLETED | OUTPATIENT
Start: 2023-04-08 | End: 2023-04-08

## 2023-04-08 RX ORDER — PHYTONADIONE (VIT K1) 5 MG
10 TABLET ORAL ONCE
Refills: 0 | Status: COMPLETED | OUTPATIENT
Start: 2023-04-08 | End: 2023-04-08

## 2023-04-08 RX ADMIN — PIPERACILLIN AND TAZOBACTAM 25 GRAM(S): 4; .5 INJECTION, POWDER, LYOPHILIZED, FOR SOLUTION INTRAVENOUS at 13:41

## 2023-04-08 RX ADMIN — Medication 102 MILLIGRAM(S): at 09:51

## 2023-04-08 RX ADMIN — Medication 20 MILLIEQUIVALENT(S): at 11:30

## 2023-04-08 RX ADMIN — Medication 20 MILLIEQUIVALENT(S): at 10:11

## 2023-04-08 RX ADMIN — ENOXAPARIN SODIUM 75 MILLIGRAM(S): 100 INJECTION SUBCUTANEOUS at 05:16

## 2023-04-08 RX ADMIN — ENOXAPARIN SODIUM 75 MILLIGRAM(S): 100 INJECTION SUBCUTANEOUS at 17:05

## 2023-04-08 RX ADMIN — PIPERACILLIN AND TAZOBACTAM 25 GRAM(S): 4; .5 INJECTION, POWDER, LYOPHILIZED, FOR SOLUTION INTRAVENOUS at 05:15

## 2023-04-08 NOTE — CHART NOTE - NSCHARTNOTEFT_GEN_A_CORE
PRE-INTERVENTIONAL RADIOLOGY PROCEDURE NOTE      Patient Age:     Patient Gender: 72    Procedure: IR drainage abdominal collection    Diagnosis/Indication: Abd Collection    Interventional Radiology Attending Physician: Franco    Ordering Attending Physician: Dr Merrill    Pertinent Medical History: Diverticulitis contained perforation. PE, (eliquis on hold)    Pertinent labs:                      7.7    9.66  )-----------( 374      ( 08 Apr 2023 07:00 )             25.0       04-08    140  |  106  |  8   ----------------------------<  105<H>  3.6   |  24  |  0.80    Ca    8.2<L>      08 Apr 2023 07:00  Phos  3.3     04-08  Mg     2.30     04-08        PT/INR - ( 08 Apr 2023 07:00 )   PT: 18.8 sec;   INR: 1.61 ratio         PTT - ( 08 Apr 2023 07:00 )  PTT:34.9 sec        Patient and Family Aware ? Yes

## 2023-04-08 NOTE — PROGRESS NOTE ADULT - ATTENDING COMMENTS
Perforated colon with abscess  -Plan for IR drainage
Perforated sigmoid colon mass vs. diverticulitis; PE  -CEA within normal limits  -cont anticoagulation  -advance diet today  -cont IV abx  -reimaging at POD4/5
acute on chronic anemia  -hgb responded appropriately to PRBC transfusion    no bleeding seen on scan or on exam  -switch to weight based t-lovneox for PE without right heart strain    may transfer to regular [facility] for ongoing care for perforated diverticulitis by A team

## 2023-04-09 LAB
ANION GAP SERPL CALC-SCNC: 9 MMOL/L — SIGNIFICANT CHANGE UP (ref 7–14)
APTT BLD: 30.1 SEC — SIGNIFICANT CHANGE UP (ref 27–36.3)
BLD GP AB SCN SERPL QL: NEGATIVE — SIGNIFICANT CHANGE UP
BUN SERPL-MCNC: 6 MG/DL — LOW (ref 7–23)
CALCIUM SERPL-MCNC: 8.5 MG/DL — SIGNIFICANT CHANGE UP (ref 8.4–10.5)
CHLORIDE SERPL-SCNC: 109 MMOL/L — HIGH (ref 98–107)
CO2 SERPL-SCNC: 23 MMOL/L — SIGNIFICANT CHANGE UP (ref 22–31)
CREAT SERPL-MCNC: 0.79 MG/DL — SIGNIFICANT CHANGE UP (ref 0.5–1.3)
EGFR: 80 ML/MIN/1.73M2 — SIGNIFICANT CHANGE UP
GLUCOSE SERPL-MCNC: 97 MG/DL — SIGNIFICANT CHANGE UP (ref 70–99)
HCT VFR BLD CALC: 23.6 % — LOW (ref 34.5–45)
HGB BLD-MCNC: 7.3 G/DL — LOW (ref 11.5–15.5)
INR BLD: 1.38 RATIO — HIGH (ref 0.88–1.16)
MAGNESIUM SERPL-MCNC: 2.1 MG/DL — SIGNIFICANT CHANGE UP (ref 1.6–2.6)
MCHC RBC-ENTMCNC: 23.4 PG — LOW (ref 27–34)
MCHC RBC-ENTMCNC: 30.9 GM/DL — LOW (ref 32–36)
MCV RBC AUTO: 75.6 FL — LOW (ref 80–100)
NRBC # BLD: 0 /100 WBCS — SIGNIFICANT CHANGE UP (ref 0–0)
NRBC # FLD: 0 K/UL — SIGNIFICANT CHANGE UP (ref 0–0)
PHOSPHATE SERPL-MCNC: 3.2 MG/DL — SIGNIFICANT CHANGE UP (ref 2.5–4.5)
PLATELET # BLD AUTO: 429 K/UL — HIGH (ref 150–400)
POTASSIUM SERPL-MCNC: 4.2 MMOL/L — SIGNIFICANT CHANGE UP (ref 3.5–5.3)
POTASSIUM SERPL-SCNC: 4.2 MMOL/L — SIGNIFICANT CHANGE UP (ref 3.5–5.3)
PROTHROM AB SERPL-ACNC: 16.1 SEC — HIGH (ref 10.5–13.4)
RBC # BLD: 3.12 M/UL — LOW (ref 3.8–5.2)
RBC # FLD: 19.5 % — HIGH (ref 10.3–14.5)
RH IG SCN BLD-IMP: POSITIVE — SIGNIFICANT CHANGE UP
SODIUM SERPL-SCNC: 141 MMOL/L — SIGNIFICANT CHANGE UP (ref 135–145)
WBC # BLD: 9.73 K/UL — SIGNIFICANT CHANGE UP (ref 3.8–10.5)
WBC # FLD AUTO: 9.73 K/UL — SIGNIFICANT CHANGE UP (ref 3.8–10.5)

## 2023-04-09 PROCEDURE — 99231 SBSQ HOSP IP/OBS SF/LOW 25: CPT

## 2023-04-09 RX ORDER — PHYTONADIONE (VIT K1) 5 MG
10 TABLET ORAL ONCE
Refills: 0 | Status: COMPLETED | OUTPATIENT
Start: 2023-04-09 | End: 2023-04-09

## 2023-04-09 RX ADMIN — Medication 102 MILLIGRAM(S): at 11:33

## 2023-04-10 ENCOUNTER — TRANSCRIPTION ENCOUNTER (OUTPATIENT)
Age: 73
End: 2023-04-10

## 2023-04-10 VITALS
TEMPERATURE: 98 F | SYSTOLIC BLOOD PRESSURE: 122 MMHG | HEART RATE: 115 BPM | RESPIRATION RATE: 18 BRPM | DIASTOLIC BLOOD PRESSURE: 69 MMHG

## 2023-04-10 LAB
ANION GAP SERPL CALC-SCNC: 11 MMOL/L — SIGNIFICANT CHANGE UP (ref 7–14)
APTT BLD: 29.6 SEC — SIGNIFICANT CHANGE UP (ref 27–36.3)
BUN SERPL-MCNC: 6 MG/DL — LOW (ref 7–23)
CALCIUM SERPL-MCNC: 8.7 MG/DL — SIGNIFICANT CHANGE UP (ref 8.4–10.5)
CHLORIDE SERPL-SCNC: 106 MMOL/L — SIGNIFICANT CHANGE UP (ref 98–107)
CO2 SERPL-SCNC: 23 MMOL/L — SIGNIFICANT CHANGE UP (ref 22–31)
CREAT SERPL-MCNC: 0.82 MG/DL — SIGNIFICANT CHANGE UP (ref 0.5–1.3)
EGFR: 77 ML/MIN/1.73M2 — SIGNIFICANT CHANGE UP
GLUCOSE SERPL-MCNC: 92 MG/DL — SIGNIFICANT CHANGE UP (ref 70–99)
HCT VFR BLD CALC: 25 % — LOW (ref 34.5–45)
HGB BLD-MCNC: 7.6 G/DL — LOW (ref 11.5–15.5)
INR BLD: 1.26 RATIO — HIGH (ref 0.88–1.16)
MAGNESIUM SERPL-MCNC: 2.1 MG/DL — SIGNIFICANT CHANGE UP (ref 1.6–2.6)
MCHC RBC-ENTMCNC: 23.2 PG — LOW (ref 27–34)
MCHC RBC-ENTMCNC: 30.4 GM/DL — LOW (ref 32–36)
MCV RBC AUTO: 76.5 FL — LOW (ref 80–100)
NRBC # BLD: 0 /100 WBCS — SIGNIFICANT CHANGE UP (ref 0–0)
NRBC # FLD: 0 K/UL — SIGNIFICANT CHANGE UP (ref 0–0)
PHOSPHATE SERPL-MCNC: 3.3 MG/DL — SIGNIFICANT CHANGE UP (ref 2.5–4.5)
PLATELET # BLD AUTO: 419 K/UL — HIGH (ref 150–400)
POTASSIUM SERPL-MCNC: 4 MMOL/L — SIGNIFICANT CHANGE UP (ref 3.5–5.3)
POTASSIUM SERPL-SCNC: 4 MMOL/L — SIGNIFICANT CHANGE UP (ref 3.5–5.3)
PROTHROM AB SERPL-ACNC: 14.7 SEC — HIGH (ref 10.5–13.4)
RBC # BLD: 3.27 M/UL — LOW (ref 3.8–5.2)
RBC # FLD: 19.9 % — HIGH (ref 10.3–14.5)
SODIUM SERPL-SCNC: 140 MMOL/L — SIGNIFICANT CHANGE UP (ref 135–145)
WBC # BLD: 9.17 K/UL — SIGNIFICANT CHANGE UP (ref 3.8–10.5)
WBC # FLD AUTO: 9.17 K/UL — SIGNIFICANT CHANGE UP (ref 3.8–10.5)

## 2023-04-10 PROCEDURE — 72192 CT PELVIS W/O DYE: CPT | Mod: 26

## 2023-04-10 RX ORDER — CIPROFLOXACIN LACTATE 400MG/40ML
1 VIAL (ML) INTRAVENOUS
Qty: 42 | Refills: 0
Start: 2023-04-10 | End: 2023-04-30

## 2023-04-10 RX ORDER — APIXABAN 2.5 MG/1
5 TABLET, FILM COATED ORAL EVERY 12 HOURS
Refills: 0 | Status: DISCONTINUED | OUTPATIENT
Start: 2023-04-10 | End: 2023-04-10

## 2023-04-10 RX ORDER — METRONIDAZOLE 500 MG
1 TABLET ORAL
Qty: 63 | Refills: 0
Start: 2023-04-10 | End: 2023-04-30

## 2023-04-10 RX ORDER — SODIUM CHLORIDE 9 MG/ML
1000 INJECTION, SOLUTION INTRAVENOUS
Refills: 0 | Status: DISCONTINUED | OUTPATIENT
Start: 2023-04-10 | End: 2023-04-10

## 2023-04-10 RX ORDER — PIPERACILLIN AND TAZOBACTAM 4; .5 G/20ML; G/20ML
3.38 INJECTION, POWDER, LYOPHILIZED, FOR SOLUTION INTRAVENOUS EVERY 8 HOURS
Refills: 0 | Status: DISCONTINUED | OUTPATIENT
Start: 2023-04-10 | End: 2023-04-10

## 2023-04-10 RX ADMIN — PIPERACILLIN AND TAZOBACTAM 25 GRAM(S): 4; .5 INJECTION, POWDER, LYOPHILIZED, FOR SOLUTION INTRAVENOUS at 07:30

## 2023-04-10 RX ADMIN — APIXABAN 5 MILLIGRAM(S): 2.5 TABLET, FILM COATED ORAL at 17:09

## 2023-04-10 RX ADMIN — SODIUM CHLORIDE 125 MILLILITER(S): 9 INJECTION, SOLUTION INTRAVENOUS at 13:21

## 2023-04-10 RX ADMIN — SODIUM CHLORIDE 125 MILLILITER(S): 9 INJECTION, SOLUTION INTRAVENOUS at 09:52

## 2023-04-10 RX ADMIN — PIPERACILLIN AND TAZOBACTAM 25 GRAM(S): 4; .5 INJECTION, POWDER, LYOPHILIZED, FOR SOLUTION INTRAVENOUS at 13:21

## 2023-04-10 RX ADMIN — SODIUM CHLORIDE 125 MILLILITER(S): 9 INJECTION, SOLUTION INTRAVENOUS at 06:05

## 2023-04-10 NOTE — PROGRESS NOTE ADULT - SUBJECTIVE AND OBJECTIVE BOX
Subjective:  No acute overnight events.   Patient seen and examined at bedside with surgical team during morning rounds.    PHYSICAL EXAM:  Constitutional: NAD  Respiratory: Unlabored breathing  Abdomen: Soft, nondistended, NTTP. No rebound or guarding.  Extremities: WWP, CHADWICK spontaneously        T(C): 37.3 (04-06-23 @ 14:00), Max: 37.6 (04-05-23 @ 22:21)  HR: 71 (04-06-23 @ 14:00) (70 - 83)  BP: 123/64 (04-06-23 @ 14:00) (108/65 - 137/61)  RR: 18 (04-06-23 @ 14:00) (16 - 18)  SpO2: 98% (04-06-23 @ 14:00) (98% - 100%)      04-05-23 @ 07:01  -  04-06-23 @ 07:00  --------------------------------------------------------  IN: 0 mL / OUT: 1200 mL / NET: -1200 mL    04-06-23 @ 07:01  -  04-06-23 @ 16:32  --------------------------------------------------------  IN: 710 mL / OUT: 0 mL / NET: 710 mL        LABS:  cret                        8.3    10.93 )-----------( 443      ( 06 Apr 2023 06:00 )             26.9     04-06    140  |  108<H>  |  5<L>  ----------------------------<  106<H>  3.9   |  22  |  0.74    Ca    8.2<L>      06 Apr 2023 06:00  Phos  2.8     04-06  Mg     2.00     04-06            acetaminophen     Tablet .. 975 milliGRAM(s) Oral every 6 hours PRN  apixaban 10 milliGRAM(s) Oral two times a day  oxyCODONE    IR 10 milliGRAM(s) Oral every 4 hours PRN  oxyCODONE    IR 5 milliGRAM(s) Oral every 6 hours PRN  piperacillin/tazobactam IVPB.. 3.375 Gram(s) IV Intermittent every 8 hours      Imaging:
Surgery Progress Note    INTERVAL EVENTS:   No acute events overnight.    SUBJECTIVE: Patient seen and examined at bedside with surgical team, denies pain, nausea or vomiting. No nausea or vomiting, tolerating LRD    OBJECTIVE:    Vital Signs Last 24 Hrs  T(C): 36.4 (08 Apr 2023 05:20), Max: 38.5 (07 Apr 2023 18:00)  T(F): 97.6 (08 Apr 2023 05:20), Max: 101.3 (07 Apr 2023 18:00)  HR: 57 (08 Apr 2023 05:20) (57 - 86)  BP: 106/62 (08 Apr 2023 05:20) (106/62 - 127/65)  BP(mean): --  RR: 18 (08 Apr 2023 05:20) (16 - 18)  SpO2: 100% (08 Apr 2023 05:20) (98% - 100%)    Parameters below as of 08 Apr 2023 05:20  Patient On (Oxygen Delivery Method): room air    I&O's Detail    07 Apr 2023 07:01  -  08 Apr 2023 07:00  --------------------------------------------------------  IN:    IV PiggyBack: 300 mL    Oral Fluid: 1220 mL  Total IN: 1520 mL    OUT:    Voided (mL): 0 mL  Total OUT: 0 mL    Total NET: 1520 mL      MEDICATIONS  (STANDING):  enoxaparin Injectable 75 milliGRAM(s) SubCutaneous every 12 hours  phytonadione  IVPB 10 milliGRAM(s) IV Intermittent once  piperacillin/tazobactam IVPB.. 3.375 Gram(s) IV Intermittent every 8 hours    MEDICATIONS  (PRN):  acetaminophen     Tablet .. 975 milliGRAM(s) Oral every 6 hours PRN Mild Pain (1 - 3)  oxyCODONE    IR 5 milliGRAM(s) Oral every 6 hours PRN Moderate Pain (4 - 6)  oxyCODONE    IR 10 milliGRAM(s) Oral every 4 hours PRN Severe Pain (7 - 10)      PHYSICAL EXAM:  Constitutional: NAD  Respiratory: Unlabored breathing  Abdomen: Soft, nondistended, NTTP. No rebound or guarding.  Extremities: WWP, CHADWICK spontaneously    LABS:                        7.7    9.66  )-----------( 374      ( 08 Apr 2023 07:00 )             25.0     04-07    140  |  107  |  5<L>  ----------------------------<  100<H>  3.9   |  24  |  0.79    Ca    8.6      07 Apr 2023 06:56  Phos  3.8     04-07  Mg     2.10     04-07      PT/INR - ( 08 Apr 2023 07:00 )   PT: 18.8 sec;   INR: 1.61 ratio         PTT - ( 08 Apr 2023 07:00 )  PTT:34.9 sec          IMAGING:    
Surgery Progress Note    INTERVAL EVENTS:   No acute events overnight.    SUBJECTIVE: Patient seen and examined at bedside with surgical team, improved  pain, no SOB or chest pain, denies nausea or vomiting.     OBJECTIVE:    Vital Signs Last 24 Hrs  T(C): 37.8 (02 Apr 2023 04:00), Max: 37.8 (02 Apr 2023 04:00)  T(F): 100 (02 Apr 2023 04:00), Max: 100 (02 Apr 2023 04:00)  HR: 71 (02 Apr 2023 08:00) (67 - 98)  BP: 113/57 (02 Apr 2023 08:00) (97/74 - 137/65)  BP(mean): 74 (02 Apr 2023 08:00) (72 - 85)  RR: 16 (02 Apr 2023 08:00) (15 - 20)  SpO2: 99% (02 Apr 2023 08:00) (96% - 100%)    Parameters below as of 02 Apr 2023 08:00  Patient On (Oxygen Delivery Method): nasal cannula w/ humidification    I&O's Detail    02 Apr 2023 07:01  -  02 Apr 2023 10:21  --------------------------------------------------------  IN:    Heparin: 26 mL    Lactated Ringers: 250 mL  Total IN: 276 mL    OUT:  Total OUT: 0 mL    Total NET: 276 mL      MEDICATIONS  (STANDING):  chlorhexidine 2% Cloths 1 Application(s) Topical daily  enoxaparin Injectable 75 milliGRAM(s) SubCutaneous every 12 hours  lactated ringers. 1000 milliLiter(s) (125 mL/Hr) IV Continuous <Continuous>  piperacillin/tazobactam IVPB.. 3.375 Gram(s) IV Intermittent every 8 hours    MEDICATIONS  (PRN):  acetaminophen     Tablet .. 650 milliGRAM(s) Oral every 6 hours PRN Mild Pain (1 - 3)  HYDROmorphone  Injectable 0.5 milliGRAM(s) IV Push every 6 hours PRN Severe Pain (7 - 10)  oxyCODONE    IR 5 milliGRAM(s) Oral every 6 hours PRN Moderate Pain (4 - 6)      PHYSICAL EXAM:  Constitutional: NAD  Respiratory: Unlabored breathing  Abdomen: Soft, nondistended, minimal tenderness on LLQ. . No rebound or guarding.  Extremities: WWP, CHADWICK spontaneously    LABS:                        8.4    13.82 )-----------( 441      ( 02 Apr 2023 05:20 )             27.3     04-02    135  |  103  |  9   ----------------------------<  86  3.8   |  21<L>  |  0.83    Ca    8.4      02 Apr 2023 02:07  Phos  3.6     04-02  Mg     1.90     04-02    TPro  6.7  /  Alb  2.6<L>  /  TBili  0.4  /  DBili  x   /  AST  14  /  ALT  9   /  AlkPhos  138<H>  04-01    PT/INR - ( 02 Apr 2023 02:07 )   PT: 16.4 sec;   INR: 1.41 ratio         PTT - ( 02 Apr 2023 05:20 )  PTT:28.6 sec  LIVER FUNCTIONS - ( 01 Apr 2023 10:29 )  Alb: 2.6 g/dL / Pro: 6.7 g/dL / ALK PHOS: 138 U/L / ALT: 9 U/L / AST: 14 U/L / GGT: x             ABO Interpretation: O (04-02-23 @ 02:56)  ABO Interpretation: O (04-02-23 @ 02:03)      IMAGING:    
Surgery Progress Note    SUBJECTIVE: Patient seen and examined at bedside with surgical team, pain is resolved, passing gas and having loose bowel movements. tolerated CLD . Denies abdominal pain, N/V, fever, chills, SOB, chest pain.       OBJECTIVE:    Vital Signs Last 24 Hrs  T(C): 36.7 (04-04-23 @ 10:08), Max: 37.1 (04-03-23 @ 22:16)  HR: 65 (04-04-23 @ 10:08) (61 - 72)  BP: 121/67 (04-04-23 @ 10:08) (104/56 - 129/68)  ABP: --  ABP(mean): --  RR: 18 (04-04-23 @ 10:08) (16 - 19)  SpO2: 99% (04-04-23 @ 10:08) (97% - 100%)  Wt(kg): --  CVP(mm Hg): --      04-03 @ 07:01  -  04-04 @ 07:00  --------------------------------------------------------  IN:    IV PiggyBack: 200 mL    Lactated Ringers: 500 mL  Total IN: 700 mL    OUT:    Voided (mL): 700 mL  Total OUT: 700 mL    Total NET: 0 mL            MEDICATIONS  (STANDING):  enoxaparin Injectable 75 milliGRAM(s) SubCutaneous every 12 hours  lactated ringers. 1000 milliLiter(s) (125 mL/Hr) IV Continuous <Continuous>  piperacillin/tazobactam IVPB.. 3.375 Gram(s) IV Intermittent every 8 hours    MEDICATIONS  (PRN):  acetaminophen     Tablet .. 975 milliGRAM(s) Oral every 6 hours PRN Mild Pain (1 - 3)  oxyCODONE    IR 5 milliGRAM(s) Oral every 6 hours PRN Moderate Pain (4 - 6)  oxyCODONE    IR 10 milliGRAM(s) Oral every 4 hours PRN Severe Pain (7 - 10)      PHYSICAL EXAM:  Constitutional: NAD  Respiratory: Unlabored breathing  Abdomen: Soft, nondistended, NTTP. No rebound or guarding.  Extremities: WWP, CHADWICK spontaneously    LABS:    CBC (04-04 @ 05:13)                              8.5<L>                         10.58<H>  )----------------(  466<H>     --    % Neutrophils, --    % Lymphocytes, ANC: --                                  27.7<L>  CBC (04-03 @ 05:00)                              8.0<L>                         10.63<H>  )----------------(  424<H>     --    % Neutrophils, --    % Lymphocytes, ANC: --                                  25.8<L>    BMP (04-04 @ 05:13)             138     |  106     |  5<L>  		Ca++ --      Ca 8.3<L>             ---------------------------------( 96    		Mg 2.10               3.9     |  22      |  0.78  			Ph 2.6     BMP (04-03 @ 05:00)             136     |  104     |  8     		Ca++ --      Ca 8.2<L>             ---------------------------------( 65<L> 		Mg 1.80               3.7     |  20<L>   |  0.79  			Ph 3.5               
Colorectal Surgery Progress Note    INTERVAL EVENTS:   No acute events overnight.    SUBJECTIVE: Patient seen and examined at bedside with surgical team, patient denies pain, nausea or vomiting. Passing gas and having BMs.     OBJECTIVE:    Vital Signs Last 24 Hrs  T(C): 37.2 (07 Apr 2023 05:00), Max: 38.4 (06 Apr 2023 18:00)  T(F): 99 (07 Apr 2023 05:00), Max: 101.1 (06 Apr 2023 18:00)  HR: 65 (07 Apr 2023 05:00) (65 - 75)  BP: 100/60 (07 Apr 2023 05:00) (100/60 - 123/66)  BP(mean): --  RR: 18 (07 Apr 2023 05:00) (17 - 18)  SpO2: 99% (07 Apr 2023 05:00) (97% - 100%)    Parameters below as of 07 Apr 2023 05:00  Patient On (Oxygen Delivery Method): room air    I&O's Detail    06 Apr 2023 07:01  -  07 Apr 2023 07:00  --------------------------------------------------------  IN:    IV PiggyBack: 300 mL    Oral Fluid: 1790 mL  Total IN: 2090 mL    OUT:    Voided (mL): 0 mL  Total OUT: 0 mL    Total NET: 2090 mL      MEDICATIONS  (STANDING):  piperacillin/tazobactam IVPB.. 3.375 Gram(s) IV Intermittent every 8 hours    MEDICATIONS  (PRN):  acetaminophen     Tablet .. 975 milliGRAM(s) Oral every 6 hours PRN Mild Pain (1 - 3)  oxyCODONE    IR 5 milliGRAM(s) Oral every 6 hours PRN Moderate Pain (4 - 6)  oxyCODONE    IR 10 milliGRAM(s) Oral every 4 hours PRN Severe Pain (7 - 10)      PHYSICAL EXAM:  Constitutional: NAD  Respiratory: Unlabored breathing  Abdomen: Soft, nondistended, NTTP. No rebound or guarding.  Extremities: WWP, CHADWICK spontaneously    LABS:                        8.3    10.93 )-----------( 443      ( 06 Apr 2023 06:00 )             26.9     04-06    140  |  108<H>  |  5<L>  ----------------------------<  106<H>  3.9   |  22  |  0.74    Ca    8.2<L>      06 Apr 2023 06:00  Phos  2.8     04-06  Mg     2.00     04-06                IMAGING:    
HISTORY  72 F no PMH, PSH of  p/w LLQ abd pain and diarrhea for 2 weeks, labs significant for anemia with Hg 7.3, CTAP showed contained perforation possible from acute diverticulitis or neoplasm; with incidental finding of right lower lobe acute PE. SICU consulted for concerns for potential hemorrhage after initiation of AC therapy for PE finding.    24 HOUR EVENTS:    SUBJECTIVE/ROS:  [ ] A ten-point review of systems was otherwise negative except as noted.  [ ] Due to altered mental status/intubation, subjective information were not able to be obtained from the patient. History was obtained, to the extent possible, from review of the chart and collateral sources of information.      NEURO  RASS:     GCS:     [facility] ICU:  Exam:   Meds: acetaminophen     Tablet .. 650 milliGRAM(s) Oral every 6 hours PRN Mild Pain (1 - 3)  HYDROmorphone  Injectable 0.5 milliGRAM(s) IV Push every 6 hours PRN Severe Pain (7 - 10)  oxyCODONE    IR 5 milliGRAM(s) Oral every 6 hours PRN Moderate Pain (4 - 6)    [x] Adequacy of sedation and pain control has been assessed and adjusted      RESPIRATORY  RR: 20 (23 @ 23:00) (15 - 20)  SpO2: 96% (23 @ 23:00) (96% - 100%)  Wt(kg): --  Exam:   Mechanical Ventilation:     [ ] Extubation Readiness Assessed  Meds:       CARDIOVASCULAR  HR: 74 (23 @ 23:00) (67 - 98)  BP: 117/57 (23 @ 23:00) (97/74 - 132/61)  BP(mean): 74 (23 @ 23:00) (72 - 82)  ABP: --  ABP(mean): --  Wt(kg): --  CVP(cm H2O): --  VBG - ( 2023 10:29 )  pH: 7.42  /  pCO2: 36    /  pO2: 130   / HCO3: 23    / Base Excess: -0.9  /  SaO2: 97.5   Lactate: 0.9                Exam:  Cardiac Rhythm:  Perfusion     [ ]Adequate   [ ]Inadequate  Mentation   [ ]Normal       [ ]Reduced  Extremities  [ ]Warm         [ ]Cool  Volume Status [ ]Hypervolemic [ ]Euvolemic [ ]Hypovolemic  Meds:       GI/NUTRITION  Exam:  Diet:  Meds:     GENITOURINARY  I&O's Detail    Weight (kg): 73.2 ( @ 19:30)      137  |  105  |  13  ----------------------------<  96  4.5   |  22  |  0.88    Ca    8.5      2023 10:29  Mg     2.10         TPro  6.7  /  Alb  2.6<L>  /  TBili  0.4  /  DBili  x   /  AST  14  /  ALT  9   /  AlkPhos  138<H>      [ ] Pringle catheter, indication:   Meds: lactated ringers. 1000 milliLiter(s) IV Continuous <Continuous>        HEMATOLOGIC  Meds: heparin  Infusion 1300 Unit(s)/Hr IV Continuous <Continuous>    [x] VTE Prophylaxis                        7.3    11.39 )-----------( 424      ( 2023 10:29 )             24.3     PT/INR - ( 2023 14:18 )   PT: 17.7 sec;   INR: 1.52 ratio         PTT - ( 2023 14:18 )  PTT:29.1 sec  Transfusion     [ ] PRBC   [ ] Platelets   [ ] FFP   [ ] Cryoprecipitate      INFECTIOUS DISEASES  T(C): 37.2 (23 @ 20:00), Max: 37.5 (23 @ 09:07)  Wt(kg): --  WBC Count: 11.39 K/uL ( @ 10:29)    Recent Cultures:    Meds: piperacillin/tazobactam IVPB.. 3.375 Gram(s) IV Intermittent every 8 hours        ENDOCRINE  Capillary Blood Glucose    Meds:       ACCESS DEVICES:  [ ] Peripheral IV  [ ] Central Venous Line	[ ] R	[ ] L	[ ] IJ	[ ] Fem	[ ] SC	Placed:   [ ] Arterial Line		[ ] R	[ ] L	[ ] Fem	[ ] Rad	[ ] Ax	Placed:   [ ] PICC:					[ ] Mediport  [ ] Urinary Catheter, Date Placed:   [ ] Necessity of urinary, arterial, and venous catheters discussed    OTHER MEDICATIONS:  chlorhexidine 2% Cloths 1 Application(s) Topical daily      CODE STATUS:     IMAGING:
TEAM A Surgery Daily Progress Note  =====================================================    SUBJECTIVE: Patient seen and examined at bedside on AM rounds. No complaints. NPO past midnight for IR procedure    --------------------------------------------------------------------------------------  OBJECTIVE:    VITAL SIGNS:  Vital Signs Last 24 Hrs  T(C): 36.7 (10 Apr 2023 06:00), Max: 37.8 (09 Apr 2023 22:00)  T(F): 98 (10 Apr 2023 06:00), Max: 100 (09 Apr 2023 22:00)  HR: 58 (10 Apr 2023 06:00) (58 - 75)  BP: 106/59 (10 Apr 2023 06:00) (99/51 - 132/71)  BP(mean): --  RR: 18 (10 Apr 2023 06:00) (18 - 18)  SpO2: 99% (10 Apr 2023 06:00) (99% - 100%)    Parameters below as of 10 Apr 2023 06:00  Patient On (Oxygen Delivery Method): room air      --------------------------------------------------------------------------------------    EXAM:  General: NAD, resting in bed comfortably.  Cardiac: regular rate, warm and well perfused  Respiratory: Nonlabored respirations, normal cw expansion.  Abdomen: soft, mildly tender, nondistended.  Extremities: normal strength, FROM, no deformities    --------------------------------------------------------------------------------------    LABS:                        7.6    9.17  )-----------( 419      ( 10 Apr 2023 04:41 )             25.0     04-09    141  |  109<H>  |  6<L>  ----------------------------<  97  4.2   |  23  |  0.79    Ca    8.5      09 Apr 2023 06:10  Phos  3.2     04-09  Mg     2.10     04-09      PT/INR - ( 10 Apr 2023 04:41 )   PT: 14.7 sec;   INR: 1.26 ratio         PTT - ( 10 Apr 2023 04:41 )  PTT:29.6 sec      --------------------------------------------------------------------------------------    INS AND OUTS:    04-09-23 @ 07:01  -  04-10-23 @ 07:00  --------------------------------------------------------  IN: 290 mL / OUT: 0 mL / NET: 290 mL        --------------------------------------------------------------------------------------    MEDICATIONS:  MEDICATIONS  (STANDING):  lactated ringers. 1000 milliLiter(s) (125 mL/Hr) IV Continuous <Continuous>  piperacillin/tazobactam IVPB.. 3.375 Gram(s) IV Intermittent every 8 hours    MEDICATIONS  (PRN):  acetaminophen     Tablet .. 975 milliGRAM(s) Oral every 6 hours PRN Mild Pain (1 - 3)    --------------------------------------------------------------------------------------  
Colorectal Surgery Progress Note    INTERVAL EVENTS:   No acute events overnight.    SUBJECTIVE: Patient seen and examined at bedside with surgical team, no complains.    OBJECTIVE:    Vital Signs Last 24 Hrs  T(C): 36.8 (09 Apr 2023 09:27), Max: 37.9 (08 Apr 2023 17:45)  T(F): 98.3 (09 Apr 2023 09:27), Max: 100.2 (08 Apr 2023 17:45)  HR: 73 (09 Apr 2023 09:27) (59 - 80)  BP: 99/51 (09 Apr 2023 09:27) (99/51 - 132/61)  BP(mean): --  RR: 18 (09 Apr 2023 09:27) (17 - 18)  SpO2: 100% (09 Apr 2023 09:27) (97% - 100%)    Parameters below as of 09 Apr 2023 09:27  Patient On (Oxygen Delivery Method): room air    I&O's Detail    08 Apr 2023 07:01  -  09 Apr 2023 07:00  --------------------------------------------------------  IN:    IV PiggyBack: 150 mL    Oral Fluid: 1410 mL  Total IN: 1560 mL    OUT:  Total OUT: 0 mL    Total NET: 1560 mL      MEDICATIONS  (STANDING):  phytonadione  IVPB 10 milliGRAM(s) IV Intermittent once    MEDICATIONS  (PRN):  acetaminophen     Tablet .. 975 milliGRAM(s) Oral every 6 hours PRN Mild Pain (1 - 3)  oxyCODONE    IR 10 milliGRAM(s) Oral every 4 hours PRN Severe Pain (7 - 10)      PHYSICAL EXAM:  Constitutional: NAD  Respiratory: Unlabored breathing  Abdomen: Soft, nondistended, NTTP. No rebound or guarding.  Extremities: WWP, CHADWICK spontaneously    LABS:                        7.3    9.73  )-----------( 429      ( 09 Apr 2023 06:10 )             23.6     04-09    141  |  109<H>  |  6<L>  ----------------------------<  97  4.2   |  23  |  0.79    Ca    8.5      09 Apr 2023 06:10  Phos  3.2     04-09  Mg     2.10     04-09      PT/INR - ( 09 Apr 2023 06:10 )   PT: 16.1 sec;   INR: 1.38 ratio         PTT - ( 09 Apr 2023 06:10 )  PTT:30.1 sec      ABO Interpretation: O (04-09-23 @ 06:30)      IMAGING:    
Surgery Progress Note    INTERVAL EVENTS:   No acute events overnight.    SUBJECTIVE: Patient seen and examined at bedside with surgical team, pain is resolved, passing gas and having loose bowel movements. Patient is hungry.     OBJECTIVE:    Vital Signs Last 24 Hrs  T(C): 37.1 (02 Apr 2023 18:30), Max: 37.1 (02 Apr 2023 18:30)  T(F): 98.7 (02 Apr 2023 18:30), Max: 98.7 (02 Apr 2023 18:30)  HR: 65 (02 Apr 2023 18:30) (62 - 71)  BP: 123/58 (02 Apr 2023 18:30) (109/62 - 130/50)  BP(mean): 74 (02 Apr 2023 12:00) (74 - 74)  RR: 18 (02 Apr 2023 18:30) (16 - 21)  SpO2: 100% (02 Apr 2023 18:30) (99% - 100%)    Parameters below as of 02 Apr 2023 18:30  Patient On (Oxygen Delivery Method): room air    I&O's Detail    02 Apr 2023 07:01  -  03 Apr 2023 07:00  --------------------------------------------------------  IN:    Heparin: 26 mL    IV PiggyBack: 100 mL    Lactated Ringers: 2750 mL  Total IN: 2876 mL    OUT:    Voided (mL): 850 mL  Total OUT: 850 mL    Total NET: 2026 mL      MEDICATIONS  (STANDING):  enoxaparin Injectable 75 milliGRAM(s) SubCutaneous every 12 hours  lactated ringers. 1000 milliLiter(s) (125 mL/Hr) IV Continuous <Continuous>  piperacillin/tazobactam IVPB.. 3.375 Gram(s) IV Intermittent every 8 hours    MEDICATIONS  (PRN):  acetaminophen     Tablet .. 975 milliGRAM(s) Oral every 6 hours PRN Mild Pain (1 - 3)  oxyCODONE    IR 5 milliGRAM(s) Oral every 6 hours PRN Moderate Pain (4 - 6)  oxyCODONE    IR 10 milliGRAM(s) Oral every 4 hours PRN Severe Pain (7 - 10)      PHYSICAL EXAM:  Constitutional: NAD  Respiratory: Unlabored breathing  Abdomen: Soft, nondistended, NTTP. No rebound or guarding.  Extremities: WWP, CHADWICK spontaneously    LABS:                        8.0    10.63 )-----------( 424      ( 03 Apr 2023 05:00 )             25.8     04-03    136  |  104  |  8   ----------------------------<  65<L>  3.7   |  20<L>  |  0.79    Ca    8.2<L>      03 Apr 2023 05:00  Phos  3.5     04-03  Mg     1.80     04-03    TPro  6.7  /  Alb  2.6<L>  /  TBili  0.4  /  DBili  x   /  AST  14  /  ALT  9   /  AlkPhos  138<H>  04-01    PT/INR - ( 02 Apr 2023 02:07 )   PT: 16.4 sec;   INR: 1.41 ratio         PTT - ( 02 Apr 2023 05:20 )  PTT:28.6 sec  LIVER FUNCTIONS - ( 01 Apr 2023 10:29 )  Alb: 2.6 g/dL / Pro: 6.7 g/dL / ALK PHOS: 138 U/L / ALT: 9 U/L / AST: 14 U/L / GGT: x                 IMAGING:    
Team A Surgery Daily Progress Note    Subjective:   Patient seen at bedside this AM. Denies acute onset abdominal pain, N/V/D, fevers, chills, SOB, CP, lightheadedness    24h Events:   - Overnight, no acute events    Objective:  Vital Signs  T(C): 36.6 (04-05 @ 05:30), Max: 36.9 (04-04 @ 20:00)  HR: 63 (04-05 @ 05:30) (63 - 80)  BP: 118/64 (04-05 @ 05:30) (99/55 - 123/65)  RR: 18 (04-05 @ 05:30) (16 - 18)  SpO2: 100% (04-05 @ 05:30) (90% - 100%)    Physical Exam:  GEN: resting in bed comfortably in NAD  RESP: no increased WOB  ABD: soft, non-distended, non-tender without rebound tenderness or guarding  EXTR: warm, well-perfused without gross deformities; spontaneous movement in b/l U/L extrem    Labs:                        7.6    7.92  )-----------( 425      ( 05 Apr 2023 05:52 )             24.9   04-05    140  |  109<H>  |  5<L>  ----------------------------<  100<H>  3.8   |  24  |  0.77    Ca    8.1<L>      05 Apr 2023 05:52  Phos  2.9     04-05  Mg     2.00     04-05      CAPILLARY BLOOD GLUCOSE          Medications:   MEDICATIONS  (STANDING):  apixaban 10 milliGRAM(s) Oral two times a day  piperacillin/tazobactam IVPB.. 3.375 Gram(s) IV Intermittent every 8 hours    MEDICATIONS  (PRN):  acetaminophen     Tablet .. 975 milliGRAM(s) Oral every 6 hours PRN Mild Pain (1 - 3)  oxyCODONE    IR 5 milliGRAM(s) Oral every 6 hours PRN Moderate Pain (4 - 6)  oxyCODONE    IR 10 milliGRAM(s) Oral every 4 hours PRN Severe Pain (7 - 10)      Imaging:      
INTERVAL HPI/OVERNIGHT EVENTS:  Patient is a 72yFemale awaiting IR drainage of peritoneal diverticular abscess once anticoagulation agents have worn off.      Vital Signs Last 24 Hrs  T(C): 36.4 (08 Apr 2023 05:20), Max: 38.5 (07 Apr 2023 18:00)  T(F): 97.6 (08 Apr 2023 05:20), Max: 101.3 (07 Apr 2023 18:00)  HR: 57 (08 Apr 2023 05:20) (57 - 86)  BP: 106/62 (08 Apr 2023 05:20) (106/62 - 127/65)  BP(mean): --  RR: 18 (08 Apr 2023 05:20) (16 - 18)  SpO2: 100% (08 Apr 2023 05:20) (98% - 100%)    Parameters below as of 08 Apr 2023 05:20  Patient On (Oxygen Delivery Method): room air      04-07 @ 07:01  -  04-08 @ 07:00  --------------------------------------------------------  IN: 1520 mL / OUT: 0 mL / NET: 1520 mL        PHYSICAL EXAM:  Constitutional: well developed, well nourished, NAD  Eyes: anicteric  ENMT: normal facies, symmetric  Neck: supple  Respiratory: CTA bilat  Cardiovascular: RRR  Gastrointestinal: abdomen soft, nontender, nondistended. No obvious masses. No peritonitis  Extremities: FROM, warm  Neurological: intact, non-focal  Skin: no gross lesions  Lymph Nodes: no gross adenopathy  Musculoskeletal: equal strength bilateral upper and lower extremities  Psychiatric: oriented x 3; appropriate  Rectal:        LABS:                        7.7    9.66  )-----------( 374      ( 08 Apr 2023 07:00 )             25.0     04-07    140  |  107  |  5<L>  ----------------------------<  100<H>  3.9   |  24  |  0.79    Ca    8.6      07 Apr 2023 06:56  Phos  3.8     04-07  Mg     2.10     04-07      PT/INR - ( 08 Apr 2023 07:00 )   PT: 18.8 sec;   INR: 1.61 ratio         PTT - ( 08 Apr 2023 07:00 )  PTT:34.9 sec

## 2023-04-10 NOTE — DISCHARGE NOTE NURSING/CASE MANAGEMENT/SOCIAL WORK - PATIENT PORTAL LINK FT
You can access the FollowMyHealth Patient Portal offered by [facility] by registering at the following website: [email or web address]. By joining [facility] [employer] [facility] [employer]’s FollowMyHealth portal, you will also be able to view your health information using other applications (apps) compatible with our system.

## 2023-04-10 NOTE — CHART NOTE - NSCHARTNOTEFT_GEN_A_CORE
Patient brought to CT for pelvic drainage. CT obtained demonstrated significant interval decrease in pelvic fluid without window amenable to drainage. Drainage deferred at this time.

## 2023-04-10 NOTE — PROGRESS NOTE ADULT - REASON FOR ADMISSION
perforated acute diverticulitis with acute PE

## 2023-04-10 NOTE — DISCHARGE NOTE NURSING/CASE MANAGEMENT/SOCIAL WORK - NSDCPEFALRISK_GEN_ALL_CORE
For information on Fall & Injury Prevention, visit: [email or web address] OR  [email or web address] OR  https://www.cdc.gov/steadi/patient.html

## 2023-04-10 NOTE — DISCHARGE NOTE NURSING/CASE MANAGEMENT/SOCIAL WORK - NSDCFUADDAPPT_GEN_ALL_CORE_FT
Please follow up with your surgeon, Dr. Merrill as an outpatient, please call to schedule appointment next Tuesday (4/18)

## 2023-04-10 NOTE — PROGRESS NOTE ADULT - PROVIDER SPECIALTY LIST ADULT
Surgery
Colorectal Surgery
SICU
Surgery
Colorectal Surgery

## 2023-04-10 NOTE — PROGRESS NOTE ADULT - ASSESSMENT
72 F no PMH, PSH of  p/w LLQ abd pain and diarrhea for 2 weeks, labs significant for anemia with Hg 7.3, CTAP showed contained perforation possible from acute diverticulitis or neoplasm; with incidental finding of right lower lobe acute PE.  Patient is HDS. Clinically improving.      Plan:  - Diet: CLD  - Continue T lovenox.  - IV abx, zosyn  - f/u lab        A team Surgery  [id number] 
72 F no PMH, PSH of  p/w LLQ abd pain and diarrhea for 2 weeks, labs significant for anemia with Hg 7.3, CTAP showed contained perforation possible from acute diverticulitis or neoplasm; with incidental finding of right lower lobe acute PE.  Patient is HDS. Clinically improving.      Plan:  - Diet: Regular diet   - Holding Eliquis for procedure on monday.   - IV abx, zosyn  - IR drainage of collection on Monday.     A Team Surgery [id number]
72 F no PMH, PSH of  p/w LLQ abd pain and diarrhea for 2 weeks, labs significant for anemia with Hg 7.3, CTAP showed contained perforation possible from acute diverticulitis or neoplasm; with incidental finding of right lower lobe acute PE.  Patient is HDS. Clinically improving.      Plan:  - Diet: Regular diet   - discharge patient on Eliquis   - IV abx, zosyn  - monitor vital signs, I&Os  - trend daily labs  - repeat CT scan with IV/PO contrast tomorrow  - Dispo: home         A team Surgery  [id number] 
72 F no PMH, PSH of  p/w LLQ abd pain and diarrhea for 2 weeks, labs significant for anemia with Hg 7.3, CTAP showed contained perforation possible from acute diverticulitis or neoplasm; with incidental finding of right lower lobe acute PE.  Patient is HDS. Clinically improving.      Plan:  - Diet: Regular diet   -transition from T Lovenox to Eliquis   - IV abx, zosyn  - Dispo: home today         A team Surgery  [id number] 
72 F no PMH, PSH of  p/w LLQ abd pain and diarrhea for 2 weeks, labs significant for anemia with Hg 7.3, CTAP showed contained perforation possible from acute diverticulitis or neoplasm; with incidental finding of right lower lobe acute PE.  Patient is HDS. Clinically improving.      Plan:  - Diet: Regular diet, NPO at midnight   - F/U INR  - Vitamin K 10 mg.   - Holding Eliquis for procedure today.   - IV abx, zosyn  - IR drainage of collection tomorrow  .     A team surgery
72 F no PMH, PSH of  p/w LLQ abd pain and diarrhea for 2 weeks, labs significant for anemia with Hg 7.3, CTAP showed contained perforation possible from acute diverticulitis or neoplasm; with incidental finding of right lower lobe acute PE.  Patient is HDS. Clinically improving.      Plan:  - IR drainage of collection today  - Diet: NPO for IR procedure  - F/U INR  - Vitamin K 10 mg.   - Holding AC for procedure today. Will restart as soon as able  - IV abx, zosyn    A team Surgery  [id number]
72 F no PMH, PSH of  p/w LLQ abd pain and diarrhea for 2 weeks, labs significant for anemia with Hg 7.3, CTAP showed contained perforation possible from acute diverticulitis or neoplasm; with incidental finding of right lower lobe acute PE. SICU consulted for concerns for potential hemorrhage after initiation of AC therapy for PE finding.    PLAN:  Neuro:  AAOx3; currently at baseline; LOLITA  #Pain Control  -Tylenol q6  -Oxycodone q6  -Dilaudid q6    Resp:  #RLL PE  Diagnostics:  CT abd: Acute right lower lobe segmental pulmonary embolus  Pending CTA PE    Therapeutics:  Hep gtt     CV:  HD Stable; LOLITA     GI:   #Perforated Diverticulitis   Diagnostics:  CT abd: Inflammation involving the descending and proximal sigmoid colon 2/2 divertic vs IBD vs neoplasm. Extraluminal sinus tracts containing air as well as a thick walled extraluminal collection containing mostly air in the left lower quadrant.    Therapeutics:  - NPO   - Zosyn (-)   - Serial abdominal exams      Renal:  No Active Issues  -Monitor UOP  -Replete lytes PRN     Heme:  Diagnostics:  #RLL PE/? Portal vein thrombosis   CT abd: Acute right lower lobe segmental pulmonary embolus.Questionable nonocclusive thrombus in the left portal vein.    Therapeutics:  - Hep gtt  - CBC and coags q6h    ID:  #Perforated Diverticulitis   Afebrile w/ mild leukocytosis c/f gram negative organisms   - Zosyn (-)     Endo:   No Active Issues  -Monitor glucose on BMP     Code Status: Full code    Disposition: SICU  
72 F no PMH, PSH of  p/w LLQ abd pain and diarrhea for 2 weeks, labs significant for anemia with Hg 7.3, CTAP showed contained perforation possible from acute diverticulitis or neoplasm; with incidental finding of right lower lobe acute PE.  Patient is HDS. Clinically improving.      Plan:  - Continue therapeutic heparin gtt  - b/l LE venous duplex to r/o DVT  - NPO/IVF  - iv abx, zosyn  - f/u labs  - Continue care per SICU      A team Surgery  [id number] 
72 F no PMH, PSH of  p/w LLQ abd pain and diarrhea for 2 weeks, labs significant for anemia with Hg 7.3, CTAP showed contained perforation possible from acute diverticulitis or neoplasm; with incidental finding of right lower lobe acute PE.  Patient is HDS. Clinically improving.      Plan:  - Diet: Regular diet   - F/U INR  - Vitamin K 10 mg.   - Holding Eliquis for procedure on monday.   - IV abx, zosyn  - IR drainage of collection on Monday.     A team surgery
72 F no PMH, PSH of  p/w LLQ abd pain and diarrhea for 2 weeks, labs significant for anemia with Hg 7.3, CTAP showed contained perforation possible from acute diverticulitis or neoplasm; with incidental finding of right lower lobe acute PE.  Patient is HDS. Clinically improving.      Plan:  - Diet: Regular diet   - transition from T Lovenox to Eliquis   - IV abx, zosyn  - Dispo: home today     A Team Surgery [id number]
72 F no PMH, PSH of  p/w LLQ abd pain and diarrhea for 2 weeks, labs significant for anemia with Hg 7.3, CTAP showed contained perforation possible from acute diverticulitis or neoplasm; with incidental finding of right lower lobe acute PE.  Patient is HDS. Clinically improving.      Plan:  - Diet: Regular diet   - Holding Eliquis for procedure on monday.   - IV abx, zosyn  - IR drainage of collection on Monday. 
English

## 2023-04-10 NOTE — PRE PROCEDURE NOTE - HISTORY OF PRESENT ILLNESS
Pt provided w/DC instructions. New rx for keflex sent to pt's pharm. Pt verbalized understanding of DC instructions. Denied questions/concerns. Well appearing. VSS. Ambultaory w/strong, steady gait.
Interventional Radiology  Pre-Procedure Note    This is a 72y  Female  presenting for pelvic drainage    HPI:  72-year-old female with no reported past medical history, psh *2 presents to the ED with 2-week history of left lower quadrant abdominal pain.  Pain does not radiate anywhere else.  Patient endorses associated nausea, vomiting, diarrhea and poor p.o  tolerance. Patient states saw blood with BM twice in the past 2 weeks, obvious weight loss within last 2 weeks also.  She denies any fevers, chills, headaches, focal neurologic deficits, numbness, or tingling.  Last colonoscopy within last 5-10 years, polyps were found per patient. Patient also endorse she has chronic anemia but does not know baseline Hg.     In ER,  patient is afebrile, HDS. LLQ tenderness, no peritoneal sign, WBC 11.4, Hg 7.3, CTAP showed  descending and proximal sigmoid colon inflammation with air containing extraluminal collection, possible  diverticulitis, inflammatory bowel disease or neoplasm. Besides, acute RLL PE captured on CTAP.  (2023 15:13)      PAST MEDICAL & SURGICAL HISTORY:  No pertinent past medical history      S/P           Social History:     FAMILY HISTORY:      Allergies: No Known Allergies      Current Medications: acetaminophen     Tablet .. 975 milliGRAM(s) Oral every 6 hours PRN  lactated ringers. 1000 milliLiter(s) IV Continuous <Continuous>  piperacillin/tazobactam IVPB.. 3.375 Gram(s) IV Intermittent every 8 hours      Labs:                         7.6    9.17  )-----------( 419      ( 10 Apr 2023 04:41 )             25.0       04-10    140  |  106  |  6<L>  ----------------------------<  92  4.0   |  23  |  0.82    Ca    8.7      10 Apr 2023 04:41  Phos  3.3     04-10  Mg     2.10     04-10    Radiology:  < from: CT Abdomen and Pelvis w/ Oral Cont and w/ IV Cont (23 @ 15:03) >  ACC: [id number] EXAM:  CT ABDOMEN AND PELVIS OC IC   ORDERED BY: CARA ABDUL     PROCEDURE DATE:  2023          INTERPRETATION:  CLINICAL INFORMATION: 72-year-old female with   diverticulitis and associated abscess as well as pulmonary emboli    COMPARISON: CT scan 2023    CONTRAST/COMPLICATIONS:  IV Contrast: Omnipaque 350  90 cc administered   10 cc discarded  Oral Contrast: Omnipaque 300   Fruit 2o  Complications: None reported at time of study completion    PROCEDURE:  CT of theAbdomen and Pelvis was performed.  Sagittal and coronal reformats were performed.    FINDINGS:  LOWER CHEST: Partially visualized right lower lobe pulmonary emboli as   previously demonstrated. Right lower lobe atelectasis. Trace bilateral   pleural effusions.    LIVER: Within normal limits.  BILE DUCTS: Normal caliber.  GALLBLADDER: Within normal limits.  SPLEEN: Within normal limits.  PANCREAS: Within normal limits.  ADRENALS: Within normal limits.  KIDNEYS/URETERS: Progressive left sided moderate hydronephrosis with   calyceal diverticulum extending to site of pelvic abscess    BLADDER: Within normal limits.  REPRODUCTIVE ORGANS: Uterus and right adnexa within normal limits. Left   adnexa abutting site of inflammation.    BOWEL/RETROPERITONEUM/LYMPH NODES: No lymphadenopathy.   No bowel obstruction. Marked long segment mural thickening descending   colon and proximal sigmoid colon with diverticula. Adjacent gas   containing abscess, 5.8 x 3.1 cm, similar to 2023 abutting the left  psoas muscle. Additional abscess in left psoas muscle 1.5 x 1.1 cm and   extending from the left psoas muscle into the adjacent extraperitoneal   fat 2 x 1.1 cm (2:93 and 98.)  Periaortic and left extraperitoneal inflammatory change.  Appendix not visualized.  PERITONEUM: Small ascites.  VESSELS: Within normal limits.  ABDOMINAL WALL: Within normal limits.  BONES: Within normal limits.    IMPRESSION:  Mural thickening and extensive inflammation involving long segment   descending colon and proximal sigmoid colon as previously demonstrated.  Associated gas containing abscess abutting the left psoas muscle which is   also involved by smaller abscesses.  Moderate left hydroureteronephrosis to the level of the inflammatory   process in the pelvis.  Partially visualized right lower lobe pulmonary emboli as previously   demonstrated        --- End of Report ---            EVELYN FLETCHER MD; Attending Radiologist  This document has been electronically signed. 2023  3:35PM    < end of copied text >        Assessment/Plan:   This is a 72y Female  presents with pelvic collection on CT  Patient presents to IR for CT drainage. Will assess first to see if there is window amenable to drainage.  Procedure/ risks/ benefits/ goals/ alternatives were explained. All questions answered. Informed content obtained from patient. Consent placed in chart.

## 2023-04-11 PROBLEM — Z00.00 ENCOUNTER FOR PREVENTIVE HEALTH EXAMINATION: Status: ACTIVE | Noted: 2023-04-11

## 2023-04-18 ENCOUNTER — APPOINTMENT (OUTPATIENT)
Dept: COLORECTAL SURGERY | Facility: CLINIC | Age: 73
End: 2023-04-18
Payer: MEDICARE

## 2023-04-18 PROCEDURE — 99213 OFFICE O/P EST LOW 20 MIN: CPT

## 2023-04-18 NOTE — HISTORY OF PRESENT ILLNESS
[FreeTextEntry1] : 72-year-old female with recent hospital admission for perforated diverticulitis versus mass. Patient improved with antibiotics and repeat imaging showed persistent abscess. Attempt at percutaneous drainage was performed, but abscess appeared to improve significantly. She currently continues on antibiotics. Denies pain. No fevers or chills no nausea or vomiting. Patient lysis without complaint. No aggravating factors

## 2023-04-18 NOTE — ASSESSMENT
[FreeTextEntry1] : Likely perforated diverticulitis\par -Continue antibiotics\par -Will repeat CT scan to evaluate for resolution of abscess cavity\par -Patient will require colonoscopy to evaluate for malignancy\par -Patient to continue  anticoagulation per primary care physician for pulmonary embolus and found incidentally on abdominal CT\par -all questions answered\par -We'll discuss further treatment after imaging

## 2023-07-10 ENCOUNTER — INPATIENT (INPATIENT)
Facility: HOSPITAL | Age: 73
LOS: 2 days | Discharge: HOME | End: 2023-07-13
Attending: STUDENT IN AN ORGANIZED HEALTH CARE EDUCATION/TRAINING PROGRAM | Admitting: STUDENT IN AN ORGANIZED HEALTH CARE EDUCATION/TRAINING PROGRAM
Payer: MEDICARE

## 2023-07-10 VITALS
TEMPERATURE: 99 F | SYSTOLIC BLOOD PRESSURE: 101 MMHG | OXYGEN SATURATION: 100 % | DIASTOLIC BLOOD PRESSURE: 53 MMHG | RESPIRATION RATE: 16 BRPM | HEART RATE: 74 BPM

## 2023-07-10 DIAGNOSIS — R10.9 UNSPECIFIED ABDOMINAL PAIN: ICD-10-CM

## 2023-07-10 DIAGNOSIS — Z98.891 HISTORY OF UTERINE SCAR FROM PREVIOUS SURGERY: Chronic | ICD-10-CM

## 2023-07-10 LAB
ALBUMIN SERPL ELPH-MCNC: 3.8 G/DL — SIGNIFICANT CHANGE UP (ref 3.3–5)
ALP SERPL-CCNC: 88 U/L — SIGNIFICANT CHANGE UP (ref 40–120)
ALT FLD-CCNC: 6 U/L — SIGNIFICANT CHANGE UP (ref 4–33)
ANION GAP SERPL CALC-SCNC: 15 MMOL/L — HIGH (ref 7–14)
APPEARANCE UR: ABNORMAL
APTT BLD: 29.7 SEC — SIGNIFICANT CHANGE UP (ref 27–36.3)
AST SERPL-CCNC: 9 U/L — SIGNIFICANT CHANGE UP (ref 4–32)
BACTERIA # UR AUTO: ABNORMAL /HPF
BASOPHILS # BLD AUTO: 0.05 K/UL — SIGNIFICANT CHANGE UP (ref 0–0.2)
BASOPHILS NFR BLD AUTO: 0.6 % — SIGNIFICANT CHANGE UP (ref 0–2)
BILIRUB SERPL-MCNC: 0.3 MG/DL — SIGNIFICANT CHANGE UP (ref 0.2–1.2)
BILIRUB UR-MCNC: NEGATIVE — SIGNIFICANT CHANGE UP
BLD GP AB SCN SERPL QL: NEGATIVE — SIGNIFICANT CHANGE UP
BUN SERPL-MCNC: 14 MG/DL — SIGNIFICANT CHANGE UP (ref 7–23)
CALCIUM SERPL-MCNC: 9.7 MG/DL — SIGNIFICANT CHANGE UP (ref 8.4–10.5)
CAST: 1 /LPF — SIGNIFICANT CHANGE UP (ref 0–4)
CHLORIDE SERPL-SCNC: 103 MMOL/L — SIGNIFICANT CHANGE UP (ref 98–107)
CO2 SERPL-SCNC: 21 MMOL/L — LOW (ref 22–31)
COLOR SPEC: YELLOW — SIGNIFICANT CHANGE UP
CREAT SERPL-MCNC: 0.97 MG/DL — SIGNIFICANT CHANGE UP (ref 0.5–1.3)
DIFF PNL FLD: NEGATIVE — SIGNIFICANT CHANGE UP
EGFR: 62 ML/MIN/1.73M2 — SIGNIFICANT CHANGE UP
EOSINOPHIL # BLD AUTO: 0.02 K/UL — SIGNIFICANT CHANGE UP (ref 0–0.5)
EOSINOPHIL NFR BLD AUTO: 0.2 % — SIGNIFICANT CHANGE UP (ref 0–6)
GLUCOSE SERPL-MCNC: 102 MG/DL — HIGH (ref 70–99)
GLUCOSE UR QL: NEGATIVE MG/DL — SIGNIFICANT CHANGE UP
HCT VFR BLD CALC: 31.2 % — LOW (ref 34.5–45)
HGB BLD-MCNC: 9.6 G/DL — LOW (ref 11.5–15.5)
IANC: 5.81 K/UL — SIGNIFICANT CHANGE UP (ref 1.8–7.4)
IMM GRANULOCYTES NFR BLD AUTO: 0.4 % — SIGNIFICANT CHANGE UP (ref 0–0.9)
INR BLD: 1.29 RATIO — HIGH (ref 0.88–1.16)
KETONES UR-MCNC: ABNORMAL MG/DL
LEUKOCYTE ESTERASE UR-ACNC: ABNORMAL
LIDOCAIN IGE QN: 17 U/L — SIGNIFICANT CHANGE UP (ref 7–60)
LYMPHOCYTES # BLD AUTO: 1.55 K/UL — SIGNIFICANT CHANGE UP (ref 1–3.3)
LYMPHOCYTES # BLD AUTO: 18.3 % — SIGNIFICANT CHANGE UP (ref 13–44)
MCHC RBC-ENTMCNC: 25 PG — LOW (ref 27–34)
MCHC RBC-ENTMCNC: 30.8 GM/DL — LOW (ref 32–36)
MCV RBC AUTO: 81.3 FL — SIGNIFICANT CHANGE UP (ref 80–100)
MONOCYTES # BLD AUTO: 1 K/UL — HIGH (ref 0–0.9)
MONOCYTES NFR BLD AUTO: 11.8 % — SIGNIFICANT CHANGE UP (ref 2–14)
NEUTROPHILS # BLD AUTO: 5.81 K/UL — SIGNIFICANT CHANGE UP (ref 1.8–7.4)
NEUTROPHILS NFR BLD AUTO: 68.7 % — SIGNIFICANT CHANGE UP (ref 43–77)
NITRITE UR-MCNC: NEGATIVE — SIGNIFICANT CHANGE UP
NRBC # BLD: 0 /100 WBCS — SIGNIFICANT CHANGE UP (ref 0–0)
NRBC # FLD: 0 K/UL — SIGNIFICANT CHANGE UP (ref 0–0)
PH UR: 5.5 — SIGNIFICANT CHANGE UP (ref 5–8)
PLATELET # BLD AUTO: 328 K/UL — SIGNIFICANT CHANGE UP (ref 150–400)
POTASSIUM SERPL-MCNC: 4.7 MMOL/L — SIGNIFICANT CHANGE UP (ref 3.5–5.3)
POTASSIUM SERPL-SCNC: 4.7 MMOL/L — SIGNIFICANT CHANGE UP (ref 3.5–5.3)
PROT SERPL-MCNC: 8.1 G/DL — SIGNIFICANT CHANGE UP (ref 6–8.3)
PROT UR-MCNC: 30 MG/DL
PROTHROM AB SERPL-ACNC: 15 SEC — HIGH (ref 10.5–13.4)
RBC # BLD: 3.84 M/UL — SIGNIFICANT CHANGE UP (ref 3.8–5.2)
RBC # FLD: 17.6 % — HIGH (ref 10.3–14.5)
RBC CASTS # UR COMP ASSIST: 3 /HPF — SIGNIFICANT CHANGE UP (ref 0–4)
REVIEW: SIGNIFICANT CHANGE UP
RH IG SCN BLD-IMP: POSITIVE — SIGNIFICANT CHANGE UP
SODIUM SERPL-SCNC: 139 MMOL/L — SIGNIFICANT CHANGE UP (ref 135–145)
SP GR SPEC: 1.02 — SIGNIFICANT CHANGE UP (ref 1–1.03)
SQUAMOUS # UR AUTO: 8 /HPF — HIGH (ref 0–5)
UROBILINOGEN FLD QL: 0.2 MG/DL — SIGNIFICANT CHANGE UP (ref 0.2–1)
WBC # BLD: 8.46 K/UL — SIGNIFICANT CHANGE UP (ref 3.8–10.5)
WBC # FLD AUTO: 8.46 K/UL — SIGNIFICANT CHANGE UP (ref 3.8–10.5)
WBC UR QL: 20 /HPF — HIGH (ref 0–5)

## 2023-07-10 PROCEDURE — 71045 X-RAY EXAM CHEST 1 VIEW: CPT | Mod: 26

## 2023-07-10 PROCEDURE — 74177 CT ABD & PELVIS W/CONTRAST: CPT | Mod: 26,MA

## 2023-07-10 PROCEDURE — 99285 EMERGENCY DEPT VISIT HI MDM: CPT | Mod: FS

## 2023-07-10 RX ORDER — METRONIDAZOLE 500 MG
TABLET ORAL
Refills: 0 | Status: DISCONTINUED | OUTPATIENT
Start: 2023-07-10 | End: 2023-07-13

## 2023-07-10 RX ORDER — FAMOTIDINE 10 MG/ML
20 INJECTION INTRAVENOUS ONCE
Refills: 0 | Status: COMPLETED | OUTPATIENT
Start: 2023-07-10 | End: 2023-07-10

## 2023-07-10 RX ORDER — SODIUM CHLORIDE 9 MG/ML
1000 INJECTION INTRAMUSCULAR; INTRAVENOUS; SUBCUTANEOUS ONCE
Refills: 0 | Status: COMPLETED | OUTPATIENT
Start: 2023-07-10 | End: 2023-07-10

## 2023-07-10 RX ORDER — METRONIDAZOLE 500 MG
500 TABLET ORAL EVERY 8 HOURS
Refills: 0 | Status: DISCONTINUED | OUTPATIENT
Start: 2023-07-11 | End: 2023-07-13

## 2023-07-10 RX ORDER — SODIUM CHLORIDE 9 MG/ML
1000 INJECTION, SOLUTION INTRAVENOUS
Refills: 0 | Status: DISCONTINUED | OUTPATIENT
Start: 2023-07-10 | End: 2023-07-11

## 2023-07-10 RX ORDER — ACETAMINOPHEN 500 MG
1000 TABLET ORAL EVERY 6 HOURS
Refills: 0 | Status: COMPLETED | OUTPATIENT
Start: 2023-07-10 | End: 2023-07-11

## 2023-07-10 RX ORDER — CIPROFLOXACIN LACTATE 400MG/40ML
VIAL (ML) INTRAVENOUS
Refills: 0 | Status: DISCONTINUED | OUTPATIENT
Start: 2023-07-10 | End: 2023-07-13

## 2023-07-10 RX ORDER — CIPROFLOXACIN LACTATE 400MG/40ML
400 VIAL (ML) INTRAVENOUS EVERY 12 HOURS
Refills: 0 | Status: DISCONTINUED | OUTPATIENT
Start: 2023-07-11 | End: 2023-07-13

## 2023-07-10 RX ORDER — HEPARIN SODIUM 5000 [USP'U]/ML
1200 INJECTION INTRAVENOUS; SUBCUTANEOUS
Qty: 25000 | Refills: 0 | Status: DISCONTINUED | OUTPATIENT
Start: 2023-07-10 | End: 2023-07-11

## 2023-07-10 RX ORDER — METRONIDAZOLE 500 MG
500 TABLET ORAL ONCE
Refills: 0 | Status: COMPLETED | OUTPATIENT
Start: 2023-07-10 | End: 2023-07-10

## 2023-07-10 RX ORDER — CIPROFLOXACIN LACTATE 400MG/40ML
400 VIAL (ML) INTRAVENOUS ONCE
Refills: 0 | Status: COMPLETED | OUTPATIENT
Start: 2023-07-10 | End: 2023-07-10

## 2023-07-10 RX ADMIN — SODIUM CHLORIDE 125 MILLILITER(S): 9 INJECTION, SOLUTION INTRAVENOUS at 19:33

## 2023-07-10 RX ADMIN — Medication 30 MILLILITER(S): at 13:07

## 2023-07-10 RX ADMIN — FAMOTIDINE 20 MILLIGRAM(S): 10 INJECTION INTRAVENOUS at 13:06

## 2023-07-10 RX ADMIN — Medication 200 MILLIGRAM(S): at 19:33

## 2023-07-10 RX ADMIN — Medication 100 MILLIGRAM(S): at 18:19

## 2023-07-10 RX ADMIN — HEPARIN SODIUM 12 UNIT(S)/HR: 5000 INJECTION INTRAVENOUS; SUBCUTANEOUS at 19:40

## 2023-07-10 RX ADMIN — SODIUM CHLORIDE 1000 MILLILITER(S): 9 INJECTION INTRAMUSCULAR; INTRAVENOUS; SUBCUTANEOUS at 13:07

## 2023-07-10 NOTE — ED PROVIDER NOTE - ATTENDING APP SHARED VISIT CONTRIBUTION OF CARE
I have evaluated the patient and agree with the documentation and assessment as made by the PORTER. We have discussed plan of care and work up for the patient.   This was a shared visit with the PORTER. I reviewed and verified the documentation and independently performed the documented:   History, Exam and Medical Decision Making.    73F, hx of perforated diverticulitis, who presents with abdominal pain. For the past few days, reports intermittent L sided abdominal pain - non-radiating, non-positional. Improves after eating foods. No dysuria/hematuria. No black/bloody stool.s No recent travel. No trauma. No vaginal bleeding. Stooling/voiding without any issues. No fevers, chills, cough, sputum, cp, sob, black/bloody stools. Physical: afebrile, non-toxic appearing, rrr, ctabl, L sided abdominal ttp, no cvat. Plan: labs, urine, CTAP. Dispo pending. ?diverticulitis vs. perforated viscous.

## 2023-07-10 NOTE — H&P ADULT - ASSESSMENT
"Chief Complaint   Patient presents with     RECHECK     right elbow lateral epicondylitis (tennis elbow), Right radial head fracture-healed- DOI:7/27/17       Initial Temp 98.2  F (36.8  C) (Temporal)  Ht 1.708 m (5' 7.25\")  Wt (!) 164.2 kg (362 lb)  BMI 56.28 kg/m2 Estimated body mass index is 56.28 kg/(m^2) as calculated from the following:    Height as of this encounter: 1.708 m (5' 7.25\").    Weight as of this encounter: 164.2 kg (362 lb).  Medication Reconciliation: complete   Steph/SUKHI     " 73F with hx of  and previous admission of perforated diverticulitis in 2023 presents with 1 week of LLQ pain found to have persistent diverticulitis with 1.6 cm psoas abscess.     Plan:  - Admit to a team surgery under Dr. Merrill   - NPO/IVF  - IV cipro/flagyl  - heparin gtt for PE found in 2023   - pain control     Plan discussed with Dr. Garfield [place] PGY-3   A Team Surgery  [id number]  73F with hx of  and previous admission of perforated diverticulitis in 2023 presents with 1 week of LLQ pain found to have persistent diverticulitis with 1.6 cm psoas abscess. Also found to have +UA with moderate left hydronephrosis on CT scan, unchanged from prior scan.     Plan:  - Admit to a team surgery under Dr. Merrill   - NPO/IVF  - IV cipro/flagyl  - heparin gtt for PE found in 2023   - pain control     Plan discussed with Dr. [place] PGY-3   A Team Surgery  [id number]

## 2023-07-10 NOTE — ED PROVIDER NOTE - PHYSICAL EXAMINATION
General: Well appearing in no acute distress, alert and cooperative  Head: Normocephalic, atraumatic  Eyes: PERRLA, no conjunctival injection, no scleral icterus  Neck: Soft and supple, full ROM without pain, no midline tenderness  Cardiac: Regular rate and regular rhythm, no murmurs, peripheral pulses 2+ and symmetric in all extremities, no LE edema.  Resp: Unlabored respiratory effort, lungs CTAB  Abd: +tenderness to LLQ and L sided of umbilicus with guarding. no rebound tenderness. abd is soft and non-distended  MSK: Spine midline and non-tender, no CVA tenderness b/l.  Skin: Warm and dry, no rashes/abrasions/lacerations  Neuro: AO x 3, moves all extremities symmetrically, Motor strength 5/5 bilaterally UE and LE, sensation grossly intact

## 2023-07-10 NOTE — ED ADULT NURSE NOTE - NS ED NOTE ABUSE RESPONSE YN
This was a shared visit with the MARY. I reviewed and verified the documentation and independently performed the documented: Yes

## 2023-07-10 NOTE — ED PROVIDER NOTE - PROGRESS NOTE DETAILS
CODY Rai: Labs reviewed. CBC and CMP normal. Lipase normal. UA with leuk esterase and WBC, however also with epithelial squamous cells. Sent urine culture. Pending CT.  Reassessment performed. Pt reports improvement in pain after receiving pepcid and maalox. CODY Rai: Spoke with surgery regarding CT results. Surgery team will come to see pt in the ED.

## 2023-07-10 NOTE — H&P ADULT - ATTENDING COMMENTS
Perforated diverticulitis with intraabdominal/peritoneal cavity abscess  -IV abx   -NPO, advance as tolerated  -DVT ppx  -OOB

## 2023-07-10 NOTE — ED ADULT TRIAGE NOTE - CHIEF COMPLAINT QUOTE
Pt. c/o diffuse abdominal pain and gas since Saturday. Denies nausea, vomiting, diarrhea or fevers. h/o diverticulitis.

## 2023-07-10 NOTE — H&P ADULT - NSHPLABSRESULTS_GEN_ALL_CORE
LABS:                        9.6    8.46  )-----------( 328      ( 10 Jul 2023 13:22 )             31.2     07-10    139  |  103  |  14  ----------------------------<  102<H>  4.7   |  21<L>  |  0.97    Ca    9.7      10 Jul 2023 13:22    TPro  8.1  /  Alb  3.8  /  TBili  0.3  /  DBili  x   /  AST  9   /  ALT  6   /  AlkPhos  88  07-10    Lactate:              Urinalysis Basic - ( 10 Jul 2023 13:40 )    Color: Yellow / Appearance: Cloudy / S.020 / pH: x  Gluc: x / Ketone: Trace mg/dL  / Bili: Negative / Urobili: 0.2 mg/dL   Blood: x / Protein: 30 mg/dL / Nitrite: Negative   Leuk Esterase: Moderate / RBC: 3 /HPF / WBC 20 /HPF   Sq Epi: x / Non Sq Epi: 8 /HPF / Bacteria: Moderate /HPF        IMAGING:  < from: CT Abdomen and Pelvis w/ IV Cont (07.10.23 @ 15:28) >    PROCEDURE:  CT of the Abdomen and Pelvis was performed.  Sagittal and coronal reformats were performed.    FINDINGS:  LOWER CHEST: Within normal limits.    LIVER: Within normal limits.  BILE DUCTS: Normal caliber.  GALLBLADDER: Within normal limits.  SPLEEN: Within normal limits.  PANCREAS: Within normal limits.  ADRENALS: Within normal limits.  KIDNEYS/URETERS: Moderate left hydroureteronephrosis to the level of   inflammation in the pelvis, similar to the prior exam of 2023.    BLADDER: Within normal limits.  REPRODUCTIVE ORGANS: Uterus and adnexa within normal limits.    BOWEL: Colonic diverticulosis. Wall thickening of the distal descending   and proximal sigmoid colon with surrounding fat infiltration and trace   ill-defined fluid which appears similar to the previous exam of 2023.   Tract of infiltration containing droplets of gas spanning the distal   descending colon and proximal sigmoid colon (series 601 images 25 through   27), which may represent a colocolonic fistula. A tract of infiltration   also extends into the left psoas muscle, where there is an associated   ill-defined low density focus containing droplets of gas within the   muscle that measures 1.6 x 1.4 cm (series 2 image 61), which corresponds   to a previously seen abscess that had measured 1.7 x 1.2 cm on 2023.   There is otherwise no discrete drainable collection.    Small hiatal hernia. No bowel obstruction. Appendix is normal.    PERITONEUM: No ascites.  VESSELS: Atherosclerotic changes. Circumaortic left renal vein.  RETROPERITONEUM/LYMPH NODES: No lymphadenopathy. Asymmetric thickening of   the left iliopsoas muscle, with additional details as above.  ABDOMINAL WALL: Within normal limits.  BONES: Degenerative changes.    IMPRESSION:  Persistent wall thickening of the distal descending and proximal sigmoid  colon with surrounding inflammatory changes, as was present on the   previous exam of 2023 and appears grossly similar in the interim.   Findings suspicious for an associated complex left lower quadrant fistula   with a tract of inflammation and droplets of gas spanning the distal   descending colon and proximal sigmoid colon, as well as a tract extending   to the left psoas muscle. Associated left psoas intramuscular collection   containing droplets of gas measuring 1.6 cm, previously 1.7 cm and   concerning for persistent intramuscular abscess.    The above findings may be related to an ongoing inflammatory process,   with neoplasm not entirely excluded.    Unchanged moderate left hydroureteronephrosis level of inflammation in   the left lower quadrant.      < end of copied text >

## 2023-07-10 NOTE — ED ADULT NURSE NOTE - PRO INTERPRETER NEED 2
English Libtayo Counseling- I discussed with the patient the risks of Libtayo including but not limited to nausea, vomiting, diarrhea, and bone or muscle pain.  The patient verbalized understanding of the proper use and possible adverse effects of Libtayo.  All of the patient's questions and concerns were addressed.

## 2023-07-10 NOTE — ED PROVIDER NOTE - OBJECTIVE STATEMENT
72 y/o F with pmhx of perforated diverticulitis 4 months ago and past surgical hx of  x2 presents to the ED c/o epigastric pain and LLQ pain x 1 week. Pt states that pain was the worst on Friday rated 10/10, started eating a bland diet and using Pepto-Bismol with some improvement in pain. Pt currently rates pain as 5/10, worse when lying down and before eating, better after eating. Pain is described as a burning sensation in the epigastric region. Pt reports associated nausea when pain is at its worse prior to eating. Pt last ate last night and reports last BM this AM, normal. Denies fever/chills, weakness, CP, SOB, V/D/C, melena or hematochezia, hemoptysis or hematemesis, dysuria, increased urinary frequency/urgency.   Pt was admitted to this hospital 4 months ago with perforated diverticulitis, received IV abx.

## 2023-07-10 NOTE — ED ADULT NURSE REASSESSMENT NOTE - NS ED NURSE REASSESS COMMENT FT1
Break RN: AAOx4, no signs of distress, 22G PIV placed to LAC, heparin drip initiated per orders, pending bed placement, fall precautions maintained
Pt alert and orientedx4, in no apparent distress. Pt denies chest pain, SOB, lightheadedness, pain. Call bell within reach, bed in lowest position, will continue to monitor.

## 2023-07-10 NOTE — ED PROVIDER NOTE - NS ED ATTENDING STATEMENT MOD
This was a shared visit with the PORTER. I reviewed and verified the documentation and independently performed the documented:

## 2023-07-10 NOTE — ED PROVIDER NOTE - CLINICAL SUMMARY MEDICAL DECISION MAKING FREE TEXT BOX
72 y/o F with pmhx of perforated diverticulitis 4 months ago and past surgical hx of  x2 presents to the ED c/o epigastric pain and LLQ pain x 1 week.   Vital signs stable in the ED. Physical exam remarkable for tenderness to palpation in LLQ with guarding.   Based on history and physical, differentials include but are not limited to perforated diverticulitis vs nonperforated diverticulitis vs duodenal or gastric ulcer vs gastritis. Plan to assess patient for acute pathology as listed above with labs, XR to evaluate for pneumoperitoneum, CT. Will administer medications for symptomatic relief, follow-up on results, and reassess. Consider surgery consult pending CT results. If workup negative, will d/c home with GI f/u.

## 2023-07-10 NOTE — H&P ADULT - NSHPPHYSICALEXAM_GEN_ALL_CORE
Physical exam:  General; NAD  Respiratory: nonlabored breathing  Abdomen: soft, ND, LLQ TTP. No rebound or guarding  Extremities: WWP

## 2023-07-10 NOTE — H&P ADULT - HISTORY OF PRESENT ILLNESS
73F with hx of  and previous admission of perforated diverticulitis in 2023 presents with 1 week of LLQ pain. Patient reports that pain was worst after food and worsened over the weekend. Passing gas and having normal BMs 2-3 a day. Denies fevers, chills, nausea, vomiting.     In the ED, vss and afebrile. WBC 14 otherwise labs WNL. CT shows persistent sigmoid and descending colon inflammation with 1.7 cm psoas abscess.

## 2023-07-11 LAB
ANION GAP SERPL CALC-SCNC: 14 MMOL/L — SIGNIFICANT CHANGE UP (ref 7–14)
APTT BLD: 52 SEC — HIGH (ref 27–36.3)
BUN SERPL-MCNC: 13 MG/DL — SIGNIFICANT CHANGE UP (ref 7–23)
CALCIUM SERPL-MCNC: 8.8 MG/DL — SIGNIFICANT CHANGE UP (ref 8.4–10.5)
CHLORIDE SERPL-SCNC: 104 MMOL/L — SIGNIFICANT CHANGE UP (ref 98–107)
CO2 SERPL-SCNC: 21 MMOL/L — LOW (ref 22–31)
CREAT SERPL-MCNC: 0.85 MG/DL — SIGNIFICANT CHANGE UP (ref 0.5–1.3)
EGFR: 73 ML/MIN/1.73M2 — SIGNIFICANT CHANGE UP
GLUCOSE SERPL-MCNC: 84 MG/DL — SIGNIFICANT CHANGE UP (ref 70–99)
HCT VFR BLD CALC: 25.6 % — LOW (ref 34.5–45)
HCT VFR BLD CALC: 26.3 % — LOW (ref 34.5–45)
HGB BLD-MCNC: 8.1 G/DL — LOW (ref 11.5–15.5)
HGB BLD-MCNC: 8.2 G/DL — LOW (ref 11.5–15.5)
MAGNESIUM SERPL-MCNC: 2 MG/DL — SIGNIFICANT CHANGE UP (ref 1.6–2.6)
MCHC RBC-ENTMCNC: 25.3 PG — LOW (ref 27–34)
MCHC RBC-ENTMCNC: 25.3 PG — LOW (ref 27–34)
MCHC RBC-ENTMCNC: 31.2 GM/DL — LOW (ref 32–36)
MCHC RBC-ENTMCNC: 31.6 GM/DL — LOW (ref 32–36)
MCV RBC AUTO: 80 FL — SIGNIFICANT CHANGE UP (ref 80–100)
MCV RBC AUTO: 81.2 FL — SIGNIFICANT CHANGE UP (ref 80–100)
NRBC # BLD: 0 /100 WBCS — SIGNIFICANT CHANGE UP (ref 0–0)
NRBC # BLD: 0 /100 WBCS — SIGNIFICANT CHANGE UP (ref 0–0)
NRBC # FLD: 0 K/UL — SIGNIFICANT CHANGE UP (ref 0–0)
NRBC # FLD: 0 K/UL — SIGNIFICANT CHANGE UP (ref 0–0)
PHOSPHATE SERPL-MCNC: 3.9 MG/DL — SIGNIFICANT CHANGE UP (ref 2.5–4.5)
PLATELET # BLD AUTO: 259 K/UL — SIGNIFICANT CHANGE UP (ref 150–400)
PLATELET # BLD AUTO: 267 K/UL — SIGNIFICANT CHANGE UP (ref 150–400)
POTASSIUM SERPL-MCNC: 3.9 MMOL/L — SIGNIFICANT CHANGE UP (ref 3.5–5.3)
POTASSIUM SERPL-SCNC: 3.9 MMOL/L — SIGNIFICANT CHANGE UP (ref 3.5–5.3)
RBC # BLD: 3.2 M/UL — LOW (ref 3.8–5.2)
RBC # BLD: 3.24 M/UL — LOW (ref 3.8–5.2)
RBC # FLD: 17.4 % — HIGH (ref 10.3–14.5)
RBC # FLD: 17.5 % — HIGH (ref 10.3–14.5)
SODIUM SERPL-SCNC: 139 MMOL/L — SIGNIFICANT CHANGE UP (ref 135–145)
WBC # BLD: 5.45 K/UL — SIGNIFICANT CHANGE UP (ref 3.8–10.5)
WBC # BLD: 6.77 K/UL — SIGNIFICANT CHANGE UP (ref 3.8–10.5)
WBC # FLD AUTO: 5.45 K/UL — SIGNIFICANT CHANGE UP (ref 3.8–10.5)
WBC # FLD AUTO: 6.77 K/UL — SIGNIFICANT CHANGE UP (ref 3.8–10.5)

## 2023-07-11 PROCEDURE — 99222 1ST HOSP IP/OBS MODERATE 55: CPT

## 2023-07-11 RX ORDER — ACETAMINOPHEN 500 MG
975 TABLET ORAL EVERY 6 HOURS
Refills: 0 | Status: DISCONTINUED | OUTPATIENT
Start: 2023-07-12 | End: 2023-07-12

## 2023-07-11 RX ORDER — HEPARIN SODIUM 5000 [USP'U]/ML
1400 INJECTION INTRAVENOUS; SUBCUTANEOUS
Qty: 25000 | Refills: 0 | Status: DISCONTINUED | OUTPATIENT
Start: 2023-07-11 | End: 2023-07-11

## 2023-07-11 RX ORDER — SODIUM CHLORIDE 9 MG/ML
1000 INJECTION, SOLUTION INTRAVENOUS
Refills: 0 | Status: DISCONTINUED | OUTPATIENT
Start: 2023-07-11 | End: 2023-07-12

## 2023-07-11 RX ORDER — APIXABAN 2.5 MG/1
5 TABLET, FILM COATED ORAL EVERY 12 HOURS
Refills: 0 | Status: DISCONTINUED | OUTPATIENT
Start: 2023-07-11 | End: 2023-07-12

## 2023-07-11 RX ADMIN — Medication 1000 MILLIGRAM(S): at 18:39

## 2023-07-11 RX ADMIN — HEPARIN SODIUM 14 UNIT(S)/HR: 5000 INJECTION INTRAVENOUS; SUBCUTANEOUS at 03:06

## 2023-07-11 RX ADMIN — Medication 1000 MILLIGRAM(S): at 13:05

## 2023-07-11 RX ADMIN — Medication 400 MILLIGRAM(S): at 12:35

## 2023-07-11 RX ADMIN — Medication 100 MILLIGRAM(S): at 21:30

## 2023-07-11 RX ADMIN — Medication 400 MILLIGRAM(S): at 05:26

## 2023-07-11 RX ADMIN — Medication 400 MILLIGRAM(S): at 18:09

## 2023-07-11 RX ADMIN — Medication 200 MILLIGRAM(S): at 06:36

## 2023-07-11 RX ADMIN — APIXABAN 5 MILLIGRAM(S): 2.5 TABLET, FILM COATED ORAL at 18:09

## 2023-07-11 RX ADMIN — SODIUM CHLORIDE 125 MILLILITER(S): 9 INJECTION, SOLUTION INTRAVENOUS at 12:36

## 2023-07-11 RX ADMIN — Medication 200 MILLIGRAM(S): at 18:10

## 2023-07-11 RX ADMIN — Medication 1000 MILLIGRAM(S): at 06:00

## 2023-07-11 RX ADMIN — Medication 400 MILLIGRAM(S): at 00:29

## 2023-07-11 RX ADMIN — Medication 100 MILLIGRAM(S): at 05:26

## 2023-07-11 RX ADMIN — Medication 100 MILLIGRAM(S): at 13:44

## 2023-07-11 NOTE — PATIENT PROFILE ADULT - FALL HARM RISK - HARM RISK INTERVENTIONS

## 2023-07-11 NOTE — PROGRESS NOTE ADULT - ASSESSMENT
73F with hx of  and previous admission of perforated diverticulitis in 2023 presents with 1 week of LLQ pain found to have persistent diverticulitis with 1.6 cm psoas abscess. Also found to have +UA with moderate left hydronephrosis on CT scan, unchanged from prior scan.     Plan:  - NPO/IVF  - May advance to [facility] for dinner if pain continues to improve.  - IV cipro/flagyl  - heparin gtt for PE found in 2023. Transition to home Eliquis  - pain control     A Team Surgery  [id number]

## 2023-07-11 NOTE — PROGRESS NOTE ADULT - SUBJECTIVE AND OBJECTIVE BOX
TEAM SURGERY PROGRESS NOTE    SUBJECTIVE: Patient seen and examined at bedside on AM rounds, patient without complaints. Having bowel movements. Pain improved.    Vital Signs Last 24 Hrs  T(C): 36.6 (11 Jul 2023 04:08), Max: 37.2 (10 Jul 2023 11:06)  T(F): 97.9 (11 Jul 2023 04:08), Max: 99 (10 Jul 2023 11:06)  HR: 56 (11 Jul 2023 04:08) (56 - 74)  BP: 107/56 (11 Jul 2023 04:08) (101/53 - 130/55)  BP(mean): 65 (10 Jul 2023 19:47) (65 - 65)  RR: 18 (11 Jul 2023 04:08) (16 - 19)  SpO2: 100% (11 Jul 2023 04:08) (98% - 100%)    Parameters below as of 11 Jul 2023 04:08  Patient On (Oxygen Delivery Method): room air      I&O's Detail    10 Jul 2023 07:01  -  11 Jul 2023 07:00  --------------------------------------------------------  IN:    Lactated Ringers: 500 mL  Total IN: 500 mL    OUT:    Voided (mL): 300 mL  Total OUT: 300 mL    Total NET: 200 mL        MEDICATIONS  (STANDING):  acetaminophen   IVPB .. 1000 milliGRAM(s) IV Intermittent every 6 hours  apixaban 5 milliGRAM(s) Oral every 12 hours  ciprofloxacin   IVPB      ciprofloxacin   IVPB 400 milliGRAM(s) IV Intermittent every 12 hours  lactated ringers. 1000 milliLiter(s) (125 mL/Hr) IV Continuous <Continuous>  metroNIDAZOLE  IVPB 500 milliGRAM(s) IV Intermittent every 8 hours  metroNIDAZOLE  IVPB        MEDICATIONS  (PRN):      Physical Exam  General: A&Ox3, NAD  Respiratory: Equal bilateral expansion  Cardiovascular: Regular rate & rhythm  Abdominal: soft. TTP in mid lower abdomen. ND.     LABS:                        8.1    5.45  )-----------( 259      ( 11 Jul 2023 06:15 )             25.6     07-11    139  |  104  |  13  ----------------------------<  84  3.9   |  21<L>  |  0.85    Ca    8.8      11 Jul 2023 06:15  Phos  3.9     07-11  Mg     2.00     07-11    TPro  8.1  /  Alb  3.8  /  TBili  0.3  /  DBili  x   /  AST  9   /  ALT  6   /  AlkPhos  88  07-10    PT/INR - ( 10 Jul 2023 17:37 )   PT: 15.0 sec;   INR: 1.29 ratio         PTT - ( 11 Jul 2023 01:51 )  PTT:52.0 sec  Urinalysis Basic - ( 11 Jul 2023 06:15 )    Color: x / Appearance: x / SG: x / pH: x  Gluc: 84 mg/dL / Ketone: x  / Bili: x / Urobili: x   Blood: x / Protein: x / Nitrite: x   Leuk Esterase: x / RBC: x / WBC x   Sq Epi: x / Non Sq Epi: x / Bacteria: x      ABO Interpretation: O (07-10-23 @ 18:13)      Patient is a 73y Female

## 2023-07-12 ENCOUNTER — TRANSCRIPTION ENCOUNTER (OUTPATIENT)
Age: 73
End: 2023-07-12

## 2023-07-12 LAB
ANION GAP SERPL CALC-SCNC: 8 MMOL/L — SIGNIFICANT CHANGE UP (ref 7–14)
BUN SERPL-MCNC: 8 MG/DL — SIGNIFICANT CHANGE UP (ref 7–23)
CALCIUM SERPL-MCNC: 8.8 MG/DL — SIGNIFICANT CHANGE UP (ref 8.4–10.5)
CHLORIDE SERPL-SCNC: 107 MMOL/L — SIGNIFICANT CHANGE UP (ref 98–107)
CO2 SERPL-SCNC: 23 MMOL/L — SIGNIFICANT CHANGE UP (ref 22–31)
CREAT SERPL-MCNC: 0.8 MG/DL — SIGNIFICANT CHANGE UP (ref 0.5–1.3)
EGFR: 79 ML/MIN/1.73M2 — SIGNIFICANT CHANGE UP
GLUCOSE SERPL-MCNC: 102 MG/DL — HIGH (ref 70–99)
HCT VFR BLD CALC: 25.1 % — LOW (ref 34.5–45)
HGB BLD-MCNC: 7.9 G/DL — LOW (ref 11.5–15.5)
MAGNESIUM SERPL-MCNC: 2.1 MG/DL — SIGNIFICANT CHANGE UP (ref 1.6–2.6)
MCHC RBC-ENTMCNC: 25.4 PG — LOW (ref 27–34)
MCHC RBC-ENTMCNC: 31.5 GM/DL — LOW (ref 32–36)
MCV RBC AUTO: 80.7 FL — SIGNIFICANT CHANGE UP (ref 80–100)
NRBC # BLD: 0 /100 WBCS — SIGNIFICANT CHANGE UP (ref 0–0)
NRBC # FLD: 0 K/UL — SIGNIFICANT CHANGE UP (ref 0–0)
PHOSPHATE SERPL-MCNC: 3.2 MG/DL — SIGNIFICANT CHANGE UP (ref 2.5–4.5)
PLATELET # BLD AUTO: 295 K/UL — SIGNIFICANT CHANGE UP (ref 150–400)
POTASSIUM SERPL-MCNC: 3.5 MMOL/L — SIGNIFICANT CHANGE UP (ref 3.5–5.3)
POTASSIUM SERPL-SCNC: 3.5 MMOL/L — SIGNIFICANT CHANGE UP (ref 3.5–5.3)
RBC # BLD: 3.11 M/UL — LOW (ref 3.8–5.2)
RBC # FLD: 17.2 % — HIGH (ref 10.3–14.5)
SODIUM SERPL-SCNC: 138 MMOL/L — SIGNIFICANT CHANGE UP (ref 135–145)
WBC # BLD: 5.32 K/UL — SIGNIFICANT CHANGE UP (ref 3.8–10.5)
WBC # FLD AUTO: 5.32 K/UL — SIGNIFICANT CHANGE UP (ref 3.8–10.5)

## 2023-07-12 PROCEDURE — 99231 SBSQ HOSP IP/OBS SF/LOW 25: CPT

## 2023-07-12 RX ORDER — POTASSIUM CHLORIDE 20 MEQ
10 PACKET (EA) ORAL
Refills: 0 | Status: DISCONTINUED | OUTPATIENT
Start: 2023-07-12 | End: 2023-07-12

## 2023-07-12 RX ORDER — POTASSIUM CHLORIDE 20 MEQ
40 PACKET (EA) ORAL ONCE
Refills: 0 | Status: COMPLETED | OUTPATIENT
Start: 2023-07-12 | End: 2023-07-12

## 2023-07-12 RX ORDER — HEPARIN SODIUM 5000 [USP'U]/ML
5000 INJECTION INTRAVENOUS; SUBCUTANEOUS EVERY 8 HOURS
Refills: 0 | Status: DISCONTINUED | OUTPATIENT
Start: 2023-07-12 | End: 2023-07-13

## 2023-07-12 RX ORDER — ACETAMINOPHEN 500 MG
1000 TABLET ORAL EVERY 6 HOURS
Refills: 0 | Status: DISCONTINUED | OUTPATIENT
Start: 2023-07-12 | End: 2023-07-13

## 2023-07-12 RX ADMIN — Medication 200 MILLIGRAM(S): at 05:02

## 2023-07-12 RX ADMIN — HEPARIN SODIUM 5000 UNIT(S): 5000 INJECTION INTRAVENOUS; SUBCUTANEOUS at 14:07

## 2023-07-12 RX ADMIN — Medication 975 MILLIGRAM(S): at 01:00

## 2023-07-12 RX ADMIN — Medication 100 MILLIEQUIVALENT(S): at 10:07

## 2023-07-12 RX ADMIN — Medication 100 MILLIGRAM(S): at 22:25

## 2023-07-12 RX ADMIN — Medication 975 MILLIGRAM(S): at 05:13

## 2023-07-12 RX ADMIN — SODIUM CHLORIDE 75 MILLILITER(S): 9 INJECTION, SOLUTION INTRAVENOUS at 08:25

## 2023-07-12 RX ADMIN — Medication 40 MILLIEQUIVALENT(S): at 14:07

## 2023-07-12 RX ADMIN — APIXABAN 5 MILLIGRAM(S): 2.5 TABLET, FILM COATED ORAL at 05:14

## 2023-07-12 RX ADMIN — Medication 100 MILLIEQUIVALENT(S): at 08:25

## 2023-07-12 RX ADMIN — Medication 975 MILLIGRAM(S): at 00:12

## 2023-07-12 RX ADMIN — HEPARIN SODIUM 5000 UNIT(S): 5000 INJECTION INTRAVENOUS; SUBCUTANEOUS at 22:25

## 2023-07-12 RX ADMIN — Medication 100 MILLIGRAM(S): at 06:11

## 2023-07-12 RX ADMIN — Medication 100 MILLIGRAM(S): at 14:07

## 2023-07-12 RX ADMIN — Medication 200 MILLIGRAM(S): at 17:02

## 2023-07-12 NOTE — DISCHARGE NOTE NURSING/CASE MANAGEMENT/SOCIAL WORK - NSDCPEFALRISK_GEN_ALL_CORE
For information on Fall & Injury Prevention, visit: [email or web address] OR  [email or web address] OR  https://www.cdc.gov/steadi/patient.html

## 2023-07-12 NOTE — PROGRESS NOTE ADULT - SUBJECTIVE AND OBJECTIVE BOX
STATUS POST:      POST OPERATIVE DAY #:     Vital Signs Last 24 Hrs  T(C): 36.6 (12 Jul 2023 05:23), Max: 36.9 (11 Jul 2023 14:00)  T(F): 97.9 (12 Jul 2023 05:23), Max: 98.4 (11 Jul 2023 14:00)  HR: 50 (12 Jul 2023 05:23) (49 - 100)  BP: 100/50 (12 Jul 2023 05:23) (100/50 - 121/50)  BP(mean): --  RR: 18 (12 Jul 2023 05:23) (18 - 18)  SpO2: 100% (12 Jul 2023 05:23) (99% - 100%)    Parameters below as of 12 Jul 2023 05:23  Patient On (Oxygen Delivery Method): room air          SUBJECTIVE: Pt seen    Pain: [ ] YES [ ] NO  Pain (0-10):              Pain Control Adequate: [ ] YES [ ] NO  SOB: [ ]YES [ ] NO  Chest Discomfort: [ ] YES [ ] NO    Nausea: [ ] YES [ ] NO           Vomiting: [ ] YES [ ] NO  Flatus: [ ] YES [ ] NO             Bowel Movement: [ ] YES [ ] NO     Void: [ ]YES [ ]No    Pringle:  NGT:  DIETER:    General Appearance: Appears well, NAD  Neck: Supple  Chest: Equal expansion bilaterally, equal breath sounds  CV: Pulse regular presently  Abdomen: Soft, nontense, appropriate incisional tenderness, dressings clean and dry and intact  Extremities: Grossly symmetric, SCD's in place     I&O's Summary    11 Jul 2023 07:01  -  12 Jul 2023 07:00  --------------------------------------------------------  IN: 2400 mL / OUT: 1300 mL / NET: 1100 mL      I&O's Detail    11 Jul 2023 07:01  -  12 Jul 2023 07:00  --------------------------------------------------------  IN:    dextrose 5% + sodium chloride 0.45%: 1500 mL    IV PiggyBack: 300 mL    Oral Fluid: 600 mL  Total IN: 2400 mL    OUT:    Voided (mL): 1300 mL  Total OUT: 1300 mL    Total NET: 1100 mL          MEDICATIONS  (STANDING):  acetaminophen     Tablet .. 975 milliGRAM(s) Oral every 6 hours  apixaban 5 milliGRAM(s) Oral every 12 hours  ciprofloxacin   IVPB      ciprofloxacin   IVPB 400 milliGRAM(s) IV Intermittent every 12 hours  dextrose 5% + sodium chloride 0.45%. 1000 milliLiter(s) (125 mL/Hr) IV Continuous <Continuous>  metroNIDAZOLE  IVPB 500 milliGRAM(s) IV Intermittent every 8 hours  metroNIDAZOLE  IVPB        MEDICATIONS  (PRN):      LABS:                        8.1    5.45  )-----------( 259      ( 11 Jul 2023 06:15 )             25.6     07-11    139  |  104  |  13  ----------------------------<  84  3.9   |  21<L>  |  0.85    Ca    8.8      11 Jul 2023 06:15  Phos  3.9     07-11  Mg     2.00     07-11    TPro  8.1  /  Alb  3.8  /  TBili  0.3  /  DBili  x   /  AST  9   /  ALT  6   /  AlkPhos  88  07-10    PT/INR - ( 10 Jul 2023 17:37 )   PT: 15.0 sec;   INR: 1.29 ratio         PTT - ( 11 Jul 2023 01:51 )  PTT:52.0 sec  Urinalysis Basic - ( 11 Jul 2023 06:15 )    Color: x / Appearance: x / SG: x / pH: x  Gluc: 84 mg/dL / Ketone: x  / Bili: x / Urobili: x   Blood: x / Protein: x / Nitrite: x   Leuk Esterase: x / RBC: x / WBC x   Sq Epi: x / Non Sq Epi: x / Bacteria: x        RADIOLOGY & ADDITIONAL STUDIES: Vital Signs Last 24 Hrs  T(C): 36.6 (12 Jul 2023 05:23), Max: 36.9 (11 Jul 2023 14:00)  T(F): 97.9 (12 Jul 2023 05:23), Max: 98.4 (11 Jul 2023 14:00)  HR: 50 (12 Jul 2023 05:23) (49 - 100)  BP: 100/50 (12 Jul 2023 05:23) (100/50 - 121/50)  BP(mean): --  RR: 18 (12 Jul 2023 05:23) (18 - 18)  SpO2: 100% (12 Jul 2023 05:23) (99% - 100%)    Parameters below as of 12 Jul 2023 05:23  Patient On (Oxygen Delivery Method): room air    SUBJECTIVE: Pt seen. Reports one episode of emesis after taking tylenol this AM. Passing flatus but no BM. No abdominal pain, SOB, or chest pain. Per patient, she is no longer taking home eliquis.     General Appearance: Appears well, NAD  Neck: Supple  Chest: Equal expansion bilaterally, equal breath sounds  CV: Pulse regular presently  Abdomen: Soft, nontender, nondistended   Extremities: Grossly symmetric, SCD's in place     I&O's Summary    11 Jul 2023 07:01  -  12 Jul 2023 07:00  --------------------------------------------------------  IN: 2400 mL / OUT: 1300 mL / NET: 1100 mL      I&O's Detail    11 Jul 2023 07:01  -  12 Jul 2023 07:00  --------------------------------------------------------  IN:    dextrose 5% + sodium chloride 0.45%: 1500 mL    IV PiggyBack: 300 mL    Oral Fluid: 600 mL  Total IN: 2400 mL    OUT:    Voided (mL): 1300 mL  Total OUT: 1300 mL    Total NET: 1100 mL    MEDICATIONS  (STANDING):  acetaminophen     Tablet .. 975 milliGRAM(s) Oral every 6 hours  apixaban 5 milliGRAM(s) Oral every 12 hours  ciprofloxacin   IVPB      ciprofloxacin   IVPB 400 milliGRAM(s) IV Intermittent every 12 hours  dextrose 5% + sodium chloride 0.45%. 1000 milliLiter(s) (125 mL/Hr) IV Continuous <Continuous>  metroNIDAZOLE  IVPB 500 milliGRAM(s) IV Intermittent every 8 hours  metroNIDAZOLE  IVPB        LABS:                        8.1    5.45  )-----------( 259      ( 11 Jul 2023 06:15 )             25.6     07-11    139  |  104  |  13  ----------------------------<  84  3.9   |  21<L>  |  0.85    Ca    8.8      11 Jul 2023 06:15  Phos  3.9     07-11  Mg     2.00     07-11    TPro  8.1  /  Alb  3.8  /  TBili  0.3  /  DBili  x   /  AST  9   /  ALT  6   /  AlkPhos  88  07-10    PT/INR - ( 10 Jul 2023 17:37 )   PT: 15.0 sec;   INR: 1.29 ratio

## 2023-07-12 NOTE — DISCHARGE NOTE NURSING/CASE MANAGEMENT/SOCIAL WORK - PATIENT PORTAL LINK FT
You can access the FollowMyHealth Patient Portal offered by [facility] by registering at the following website: [email or web address] By joining [employer]’s FollowMyHealth portal, you will also be able to view your health information using other applications (apps) compatible with our system.

## 2023-07-12 NOTE — DISCHARGE NOTE NURSING/CASE MANAGEMENT/SOCIAL WORK - NSDCPNINST_GEN_ALL_CORE
Make a follow up appointment with DR. Merrill. Call MD if you develop a fever, or have increased abdominal pain.

## 2023-07-12 NOTE — PROGRESS NOTE ADULT - ASSESSMENT
73F with hx of  and previous admission of perforated diverticulitis in 2023 presents with 1 week of LLQ pain found to have persistent diverticulitis with 1.6 cm psoas abscess. Also found to have +UA with moderate left hydronephrosis on CT scan, unchanged from prior scan.     Plan:        A Team Surgery  [id number]      73F with hx of  and previous admission of perforated diverticulitis in 2023 presents with 1 week of LLQ pain found to have persistent diverticulitis with 1.6 cm psoas abscess. Also found to have +UA with moderate left hydronephrosis on CT scan, unchanged from prior scan.     Plan:  -Continue clears; advance to [facility] at lunch  -IV cipro/flagyl  -    A Team Surgery  [id number]

## 2023-07-13 ENCOUNTER — TRANSCRIPTION ENCOUNTER (OUTPATIENT)
Age: 73
End: 2023-07-13

## 2023-07-13 VITALS
SYSTOLIC BLOOD PRESSURE: 100 MMHG | RESPIRATION RATE: 19 BRPM | TEMPERATURE: 99 F | HEART RATE: 55 BPM | OXYGEN SATURATION: 97 % | DIASTOLIC BLOOD PRESSURE: 50 MMHG

## 2023-07-13 LAB
ANION GAP SERPL CALC-SCNC: 4 MMOL/L — LOW (ref 7–14)
BUN SERPL-MCNC: 8 MG/DL — SIGNIFICANT CHANGE UP (ref 7–23)
CALCIUM SERPL-MCNC: 8.8 MG/DL — SIGNIFICANT CHANGE UP (ref 8.4–10.5)
CHLORIDE SERPL-SCNC: 114 MMOL/L — HIGH (ref 98–107)
CO2 SERPL-SCNC: 21 MMOL/L — LOW (ref 22–31)
CREAT SERPL-MCNC: 0.84 MG/DL — SIGNIFICANT CHANGE UP (ref 0.5–1.3)
CULTURE RESULTS: SIGNIFICANT CHANGE UP
EGFR: 74 ML/MIN/1.73M2 — SIGNIFICANT CHANGE UP
GLUCOSE SERPL-MCNC: 109 MG/DL — HIGH (ref 70–99)
HCT VFR BLD CALC: 25.4 % — LOW (ref 34.5–45)
HGB BLD-MCNC: 7.7 G/DL — LOW (ref 11.5–15.5)
MAGNESIUM SERPL-MCNC: 1.9 MG/DL — SIGNIFICANT CHANGE UP (ref 1.6–2.6)
MCHC RBC-ENTMCNC: 24.7 PG — LOW (ref 27–34)
MCHC RBC-ENTMCNC: 30.3 GM/DL — LOW (ref 32–36)
MCV RBC AUTO: 81.4 FL — SIGNIFICANT CHANGE UP (ref 80–100)
NRBC # BLD: 0 /100 WBCS — SIGNIFICANT CHANGE UP (ref 0–0)
NRBC # FLD: 0 K/UL — SIGNIFICANT CHANGE UP (ref 0–0)
PHOSPHATE SERPL-MCNC: 3.2 MG/DL — SIGNIFICANT CHANGE UP (ref 2.5–4.5)
PLATELET # BLD AUTO: 290 K/UL — SIGNIFICANT CHANGE UP (ref 150–400)
POTASSIUM SERPL-MCNC: 3.8 MMOL/L — SIGNIFICANT CHANGE UP (ref 3.5–5.3)
POTASSIUM SERPL-SCNC: 3.8 MMOL/L — SIGNIFICANT CHANGE UP (ref 3.5–5.3)
RBC # BLD: 3.12 M/UL — LOW (ref 3.8–5.2)
RBC # FLD: 17.5 % — HIGH (ref 10.3–14.5)
SODIUM SERPL-SCNC: 139 MMOL/L — SIGNIFICANT CHANGE UP (ref 135–145)
SPECIMEN SOURCE: SIGNIFICANT CHANGE UP
WBC # BLD: 5.38 K/UL — SIGNIFICANT CHANGE UP (ref 3.8–10.5)
WBC # FLD AUTO: 5.38 K/UL — SIGNIFICANT CHANGE UP (ref 3.8–10.5)

## 2023-07-13 PROCEDURE — 99231 SBSQ HOSP IP/OBS SF/LOW 25: CPT

## 2023-07-13 RX ORDER — METRONIDAZOLE 500 MG
1 TABLET ORAL
Qty: 90 | Refills: 0
Start: 2023-07-13 | End: 2023-08-11

## 2023-07-13 RX ORDER — ACETAMINOPHEN 500 MG
975 TABLET ORAL
Qty: 0 | Refills: 0 | DISCHARGE
Start: 2023-07-13

## 2023-07-13 RX ORDER — CIPROFLOXACIN LACTATE 400MG/40ML
1 VIAL (ML) INTRAVENOUS
Qty: 60 | Refills: 0
Start: 2023-07-13 | End: 2023-08-11

## 2023-07-13 RX ADMIN — HEPARIN SODIUM 5000 UNIT(S): 5000 INJECTION INTRAVENOUS; SUBCUTANEOUS at 05:22

## 2023-07-13 RX ADMIN — Medication 200 MILLIGRAM(S): at 05:22

## 2023-07-13 RX ADMIN — Medication 100 MILLIGRAM(S): at 05:23

## 2023-07-13 NOTE — PROGRESS NOTE ADULT - ASSESSMENT
73F with hx of  and previous admission of perforated diverticulitis in 2023 presents with 1 week of LLQ pain found to have persistent diverticulitis with 1.6 cm psoas abscess. Also found to have +UA with moderate left hydronephrosis on CT scan, unchanged from prior scan.     Plan:  -Patient tolerated LRD  -Discharge home with 30 days of cipro/flagyl     A Team Surgery  [id number]      Patient is a 73 year old female with a PMHx of  and recent admission of perforated diverticulitis (2023) who presented with left lower quadrant pain x1 week.  CT Abdomen / Pelvis performed showing persistent wall thickening of the distal descending and proximal sigmoid colon with surrounding inflammatory changes, as was present on the previous exam of 2023 and appears grossly similar in the interim.  Findings suspicious for an associated complex left lower quadrant fistula with a tract of inflammation and droplets of gas spanning the distal descending colon and proximal sigmoid colon, as well as a tract extending to the left psoas muscle.  Associated left psoas intramuscular collection containing droplets of gas measuring 1.6 cm, previously 1.7 cm and concerning for persistent intramuscular abscess.  The above findings may be related to an ongoing inflammatory process, with neoplasm not entirely excluded.  Unchanged moderate left hydroureteronephrosis level of inflammation in the left lower quadrant.      PLAN:  - Low fiber diet  - Continue with IV Flagyl and IV Cipro while inpatient  - Out of bed  - Pain control  - VTE prophylaxis with Heparin subcutaneous (patient states no longer taking full dose Eliquis at home)  - Discharge planning home today with PO Flagyl and PO Cipro x30 days      #[id number]  A Team Surgery

## 2023-07-13 NOTE — DISCHARGE NOTE PROVIDER - NSDCFUADDINST_GEN_ALL_CORE_FT
BATHING: You may shower and / or sponge bathe.  ACTIVITY: No heavy lifting or straining.  Otherwise, you may return to your usual level of physical activity.  If you are taking narcotic pain medication (such as Oxycodone or Percocet) DO NOT drive a car, operate machinery or make important decisions.  DIET: Resume a low fiber diet.  NOTIFY YOUR SURGEON IF: You have any fever (over 100.4 F) or chills, persistent nausea / vomiting, persistent diarrhea or if your pain is not controlled on your discharge pain medications.  FOLLOW-UP:   1. Please follow up with your primary care physician in one week regarding your hospitalization.    2. Please follow-up with your surgeon, Dr. Merrill within 7 days following discharge.  Please call [phone] to schedule an appointment.

## 2023-07-13 NOTE — PROGRESS NOTE ADULT - SUBJECTIVE AND OBJECTIVE BOX
GENERAL SURGERY DAILY PROGRESS NOTE:    Interval:  No acute events overnight.    Subjective:  Patient seen and examined. Reports pain is well controlled. Denies N/V. Tolerating diet. Passing flatus, +BM. Ambulating.    Vital Signs Last 24 Hrs  T(C): 37.1 (13 Jul 2023 01:37), Max: 37.1 (13 Jul 2023 01:37)  T(F): 98.8 (13 Jul 2023 01:37), Max: 98.8 (13 Jul 2023 01:37)  HR: 66 (13 Jul 2023 01:37) (57 - 87)  BP: 111/54 (13 Jul 2023 01:37) (108/54 - 130/60)  BP(mean): --  RR: 18 (13 Jul 2023 01:37) (17 - 18)  SpO2: 100% (13 Jul 2023 01:37) (99% - 100%)    Parameters below as of 13 Jul 2023 01:37  Patient On (Oxygen Delivery Method): room air        Exam:  Gen: NAD, resting in bed, alert and responding appropriately  Resp: Airway patent, non-labored respirations  Abd: Soft, ND, NT, no rebound or guarding.   Ext: No edema, WWP  Neuro: AAOx3, no focal deficits    I&O's Detail    12 Jul 2023 07:01  -  13 Jul 2023 07:00  --------------------------------------------------------  IN:    Oral Fluid: 20 mL  Total IN: 20 mL    OUT:  Total OUT: 0 mL    Total NET: 20 mL      MEDICATIONS  (STANDING):  ciprofloxacin   IVPB 400 milliGRAM(s) IV Intermittent every 12 hours  ciprofloxacin   IVPB      heparin   Injectable 5000 Unit(s) SubCutaneous every 8 hours  metroNIDAZOLE  IVPB      metroNIDAZOLE  IVPB 500 milliGRAM(s) IV Intermittent every 8 hours    MEDICATIONS  (PRN):  acetaminophen     Tablet .. 1000 milliGRAM(s) Oral every 6 hours PRN Temp greater or equal to 38C (100.4F), Mild Pain (1 - 3), Moderate Pain (4 - 6)      LABS:                        7.9    5.32  )-----------( 295      ( 12 Jul 2023 06:30 )             25.1     07-12    138  |  107  |  8   ----------------------------<  102<H>  3.5   |  23  |  0.80    Ca    8.8      12 Jul 2023 06:30  Phos  3.2     07-12  Mg     2.10     07-12        Urinalysis Basic - ( 12 Jul 2023 06:30 )    Color: x / Appearance: x / SG: x / pH: x  Gluc: 102 mg/dL / Ketone: x  / Bili: x / Urobili: x   Blood: x / Protein: x / Nitrite: x   Leuk Esterase: x / RBC: x / WBC x   Sq Epi: x / Non Sq Epi: x / Bacteria: x         Surgery Daily Progress Note  =====================================================  Interval / Overnight Events: Eliquis discontinued yesterday as patient is no longer taking it at home.  No acute events overnight.      HPI:  Patient is a 73 year old female with a PMHx of  and recent admission of perforated diverticulitis (2023) who presented with left lower quadrant pain x1 week.      PAST MEDICAL & SURGICAL HISTORY:  Diverticulitis of intestine with perforation  S/P       ALLERGIES:  No Known Allergies    --------------------------------------------------------------------------------------    MEDICATIONS:    Neurologic Medications  acetaminophen     Tablet .. 1000 milliGRAM(s) Oral every 6 hours PRN Temp greater or equal to 38C (100.4F), Mild Pain (1 - 3), Moderate Pain (4 - 6)    Hematologic/Oncologic Medications  heparin   Injectable 5000 Unit(s) SubCutaneous every 8 hours    Antimicrobial/Immunologic Medications  ciprofloxacin   IVPB 400 milliGRAM(s) IV Intermittent every 12 hours  metroNIDAZOLE  IVPB 500 milliGRAM(s) IV Intermittent every 8 hours    --------------------------------------------------------------------------------------    VITAL SIGNS:  T(C): 37 (2023 05:19), Max: 37.1 (2023 01:37)  T(F): 98.6 (2023 05:19), Max: 98.8 (2023 01:37)  HR: 55 (2023 05:19) (55 - 87)  BP: 100/50 (2023 05:19) (100/50 - 130/60)  RR: 19 (2023 05:19) (17 - 19)  SpO2: 97% (2023 05:19) (97% - 100%)    --------------------------------------------------------------------------------------    INS AND OUTS:    2023 07:01  -  2023 07:00  --------------------------------------------------------  IN:    Oral Fluid: 20 mL  Total IN: 20 mL    OUT:  Total OUT: 0 mL    Total NET: 20 mL    --------------------------------------------------------------------------------------    EXAM    NEUROLOGY  Exam: Normal, in no acute distress.    HEENT  Exam: Normocephalic, atraumatic.    RESPIRATORY  Exam: Normal expansion / effort.    CARDIOVASCULAR  Exam: S1, S2.  Regular rate and rhythm.    GI/NUTRITION  Exam: Abdomen soft, Non-tender, Non-distended.  Current Diet: Low fiber diet    MUSCULOSKELETAL  Exam: All extremities moving spontaneously without limitations.      HEMATOLOGIC  [x] VTE Prophylaxis: heparin   Injectable 5000 Unit(s) SubCutaneous every 8 hours      INFECTIOUS DISEASE  Antimicrobials/Immunologic Medications:  ciprofloxacin   IVPB 400 milliGRAM(s) IV Intermittent every 12 hours    metroNIDAZOLE  IVPB 500 milliGRAM(s) IV Intermittent every 8 hours    --------------------------------------------------------------------------------------

## 2023-07-13 NOTE — DISCHARGE NOTE PROVIDER - NSDCMRMEDTOKEN_GEN_ALL_CORE_FT
acetaminophen 325 mg oral tablet: 3 tab(s) orally every 6 hours As needed Mild Pain (1 - 3)  ciprofloxacin 500 mg oral tablet: 1 tab(s) orally 2 times a day  Eliquis Starter Pack for Treatment of DVT and PE 5 mg oral tablet: 2 tab(s) orally 2 times a day for 7 days. After please take 1 tab (5mg) two times a day for 30 days  metroNIDAZOLE 500 mg oral tablet: 1 tab(s) orally every 8 hours   Cipro 500 mg oral tablet: 1 tab(s) orally 2 times a day x 30 days  metroNIDAZOLE 500 mg oral tablet: 1 tab(s) orally every 8 hours x 30 days  Tylenol 325 mg oral tablet: 975 orally every 6 hours as needed for  moderate pain

## 2023-07-13 NOTE — DISCHARGE NOTE PROVIDER - NSDCCPCAREPLAN_GEN_ALL_CORE_FT
PRINCIPAL DISCHARGE DIAGNOSIS  Diagnosis: Abdominal pain  Assessment and Plan of Treatment: CT Abdomen / Pelvis performed on 7/10/23 showing persistent wall thickening of the distal descending and proximal sigmoid colon with surrounding inflammatory changes, as was present on the previous exam of 4/6/2023 and appears grossly similar in the interim.  Findings suspicious for an associated complex left lower quadrant fistula with a tract of inflammation and droplets of gas spanning the distal descending colon and proximal sigmoid colon, as well as a tract extending to the left psoas muscle.  Associated left psoas intramuscular collection containing droplets of gas measuring 1.6 cm, previously 1.7 cm and concerning for persistent intramuscular abscess.  The above findings may be related to an ongoing inflammatory process, with neoplasm not entirely excluded.  Unchanged moderate left hydroureteronephrosis level of inflammation in the left lower quadrant.     PRINCIPAL DISCHARGE DIAGNOSIS  Diagnosis: Abdominal pain  Assessment and Plan of Treatment:   ACTIVITY: No heavy lifting or straining. Otherwise, you may return to your usual level of physical activity. If you are taking narcotic pain medication DO NOT drive a car, operate machinery or make important decisions.  DIET: Return to your usual diet.  NOTIFY YOUR SURGEON IF YOU HAVE: any bleeding that does not stop, any fever (over 100.4 F) persistent nausea/vomiting, or if your pain is not controlled on your discharge pain medications, unable to urinate.  Please follow up with your primary care physician in one week regarding your hospitalization, bring copies of your discharge paperwork.  Please follow up with your surgeon, Dr. Merrill        SECONDARY DISCHARGE DIAGNOSES  Diagnosis: Diverticulitis of large intestine with abscess  Assessment and Plan of Treatment: CT Abdomen / Pelvis performed on 7/10/23 showing persistent wall thickening of the distal descending and proximal sigmoid colon with surrounding inflammatory changes, as was present on the previous exam of 4/6/2023 and appears grossly similar in the interim.  Findings suspicious for an associated complex left lower quadrant fistula with a tract of inflammation and droplets of gas spanning the distal descending colon and proximal sigmoid colon, as well as a tract extending to the left psoas muscle.  Associated left psoas intramuscular collection containing droplets of gas measuring 1.6 cm, previously 1.7 cm and concerning for persistent intramuscular abscess.  The above findings may be related to an ongoing inflammatory process, with neoplasm not entirely excluded.  Unchanged moderate left hydroureteronephrosis level of inflammation in the left lower quadrant.     PRINCIPAL DISCHARGE DIAGNOSIS  Diagnosis: Diverticulitis of large intestine with abscess  Assessment and Plan of Treatment: CT Abdomen / Pelvis performed on 7/10/23 showing persistent wall thickening of the distal descending and proximal sigmoid colon with surrounding inflammatory changes, as was present on the previous exam of 4/6/2023 and appears grossly similar in the interim.  Findings suspicious for an associated complex left lower quadrant fistula with a tract of inflammation and droplets of gas spanning the distal descending colon and proximal sigmoid colon, as well as a tract extending to the left psoas muscle.  Associated left psoas intramuscular collection containing droplets of gas measuring 1.6 cm, previously 1.7 cm and concerning for persistent intramuscular abscess.  The above findings may be related to an ongoing inflammatory process, with neoplasm not entirely excluded.  Unchanged moderate left hydroureteronephrosis level of inflammation in the left lower quadrant.      SECONDARY DISCHARGE DIAGNOSES  Diagnosis: Diverticulitis of large intestine with abscess  Assessment and Plan of Treatment:

## 2023-07-13 NOTE — DISCHARGE NOTE PROVIDER - NSDCFUADDAPPT_GEN_ALL_CORE_FT
Please call for a follow up appointment with your primary care medical doctor upon discharge regarding your recent hospitalization.

## 2023-07-13 NOTE — DISCHARGE NOTE PROVIDER - CARE PROVIDER_API CALL
Lorne Merrill  Surgery  [address] [place] [address], [address]  [place] [address]  Phone: [phone]  Fax: [phone]  Follow Up Time: 1 week

## 2023-07-13 NOTE — DISCHARGE NOTE PROVIDER - HOSPITAL COURSE
Patient is a 73 year old female with a PMHx of  and recent admission of perforated diverticulitis (2023) who presented with left lower quadrant pain x1 week.    On 7/10 CT Abdomen / Pelvis performed showing persistent wall thickening of the distal descending and proximal sigmoid colon with surrounding inflammatory changes, as was present on the previous exam of 2023 and appears grossly similar in the interim.  Findings suspicious for an associated complex left lower quadrant fistula with a tract of inflammation and droplets of gas spanning the distal descending colon and proximal sigmoid colon, as well as a tract extending to the left psoas muscle.  Associated left psoas intramuscular collection containing droplets of gas measuring 1.6 cm, previously 1.7 cm and concerning for persistent intramuscular abscess.  The above findings may be related to an ongoing inflammatory process, with neoplasm not entirely excluded.  Unchanged moderate left hydroureteronephrosis level of inflammation in the left lower quadrant.  Patient was made NPO with IV fluids.  She was started on IV Flagyl and IV Cipro.    On  patient was advanced to a clear liquid diet, which she tolerated well.    On  patient was advanced to a low fiber diet, which she tolerated well.    At the time of discharge, the patient was hemodynamically stable, was tolerating PO diet, was voiding urine and passing stool.  She was ambulating and was comfortable with adequate pain control.  The patient was instructed to follow up with Dr. Merrill within 1 week after discharge from the hospital.  The patient / family felt comfortable with discharge.  The patient had no other issues.    Per attending, patient deemed medically stable and ready for discharge at this time.

## 2023-07-13 NOTE — CHART NOTE - NSCHARTNOTEFT_GEN_A_CORE
RD reviewed chart for requested nutrition consult - education, MST score 2 or greater.  Patient not in room at time of visit, was informed of pt's discharge prior to RD visit.

## 2023-07-15 LAB
CULTURE RESULTS: SIGNIFICANT CHANGE UP
CULTURE RESULTS: SIGNIFICANT CHANGE UP
SPECIMEN SOURCE: SIGNIFICANT CHANGE UP
SPECIMEN SOURCE: SIGNIFICANT CHANGE UP

## 2023-07-18 PROBLEM — K57.80 DIVERTICULITIS OF INTESTINE, PART UNSPECIFIED, WITH PERFORATION AND ABSCESS WITHOUT BLEEDING: Chronic | Status: ACTIVE | Noted: 2023-07-10

## 2023-08-15 ENCOUNTER — APPOINTMENT (OUTPATIENT)
Dept: COLORECTAL SURGERY | Facility: CLINIC | Age: 73
End: 2023-08-15
Payer: MEDICARE

## 2023-08-15 DIAGNOSIS — K57.20 DIVERTICULITIS OF LARGE INTESTINE WITH PERFORATION AND ABSCESS W/OUT BLEEDING: ICD-10-CM

## 2023-08-15 PROCEDURE — 99213 OFFICE O/P EST LOW 20 MIN: CPT

## 2023-08-15 NOTE — PHYSICAL EXAM
[FreeTextEntry1] : Alert and oriented x3 Moves all extremities no edema Abdomen soft nontender No rashes

## 2023-08-15 NOTE — HISTORY OF PRESENT ILLNESS
[FreeTextEntry1] : 72-year-old female with recent hospital admission for perforated diverticulitis versus mass. Patient improved with antibiotics and repeat imaging showed persistent abscess. Attempt at percutaneous drainage was performed, but abscess appeared to improve significantly. She currently continues on antibiotics. Denies pain. No fevers or chills no nausea or vomiting. Patient lysis without complaint. No aggravating factors  [id number]-old female with recent hospital admission for perforated diverticulitis. Patient currently denies pain no bleeding per rectum. No fevers or chills normal bowel movements no abdominal discomfort otherwise without complaint no aggravating factors she has completed her antibiotic course

## 2023-08-15 NOTE — ASSESSMENT
[FreeTextEntry1] : Perforated diverticulitis with abscess and likely fistula -Recommend the patient undergo repeat CT to ensure resolution abscess -We also discussed elective sigmoid colon resection. I recommended she undergo the procedure given multiple bouts of complicated diverticulitis. We also discussed the possibility of emergent intervention in the future. Patient is adamant about not proceeding with surgery and we'll hold off at this time -All questions were answered

## 2023-08-19 ENCOUNTER — OUTPATIENT (OUTPATIENT)
Dept: OUTPATIENT SERVICES | Facility: HOSPITAL | Age: 73
LOS: 1 days | End: 2023-08-19
Payer: MEDICARE

## 2023-08-19 ENCOUNTER — RESULT REVIEW (OUTPATIENT)
Age: 73
End: 2023-08-19

## 2023-08-19 ENCOUNTER — APPOINTMENT (OUTPATIENT)
Dept: CT IMAGING | Facility: CLINIC | Age: 73
End: 2023-08-19
Payer: MEDICARE

## 2023-08-19 DIAGNOSIS — Z98.891 HISTORY OF UTERINE SCAR FROM PREVIOUS SURGERY: Chronic | ICD-10-CM

## 2023-08-19 DIAGNOSIS — K57.20 DIVERTICULITIS OF LARGE INTESTINE WITH PERFORATION AND ABSCESS WITHOUT BLEEDING: ICD-10-CM

## 2023-08-19 PROCEDURE — 74177 CT ABD & PELVIS W/CONTRAST: CPT | Mod: MH

## 2023-08-19 PROCEDURE — 74177 CT ABD & PELVIS W/CONTRAST: CPT | Mod: 26,MH
